# Patient Record
Sex: FEMALE | Race: OTHER | HISPANIC OR LATINO | URBAN - METROPOLITAN AREA
[De-identification: names, ages, dates, MRNs, and addresses within clinical notes are randomized per-mention and may not be internally consistent; named-entity substitution may affect disease eponyms.]

---

## 2021-12-24 ENCOUNTER — INPATIENT (INPATIENT)
Facility: HOSPITAL | Age: 76
LOS: 2 days | Discharge: HOME | End: 2021-12-27
Attending: INTERNAL MEDICINE | Admitting: INTERNAL MEDICINE
Payer: MEDICARE

## 2021-12-24 VITALS
WEIGHT: 153 LBS | SYSTOLIC BLOOD PRESSURE: 192 MMHG | OXYGEN SATURATION: 99 % | RESPIRATION RATE: 18 BRPM | DIASTOLIC BLOOD PRESSURE: 82 MMHG | HEART RATE: 84 BPM | TEMPERATURE: 98 F

## 2021-12-24 LAB
BASOPHILS # BLD AUTO: 0.02 K/UL — SIGNIFICANT CHANGE UP (ref 0–0.2)
BASOPHILS NFR BLD AUTO: 0.3 % — SIGNIFICANT CHANGE UP (ref 0–1)
EOSINOPHIL # BLD AUTO: 0.07 K/UL — SIGNIFICANT CHANGE UP (ref 0–0.7)
EOSINOPHIL NFR BLD AUTO: 0.9 % — SIGNIFICANT CHANGE UP (ref 0–8)
HCT VFR BLD CALC: 37.2 % — SIGNIFICANT CHANGE UP (ref 37–47)
HGB BLD-MCNC: 12.4 G/DL — SIGNIFICANT CHANGE UP (ref 12–16)
IMM GRANULOCYTES NFR BLD AUTO: 0.6 % — HIGH (ref 0.1–0.3)
LYMPHOCYTES # BLD AUTO: 1.07 K/UL — LOW (ref 1.2–3.4)
LYMPHOCYTES # BLD AUTO: 13.6 % — LOW (ref 20.5–51.1)
MCHC RBC-ENTMCNC: 33.3 G/DL — SIGNIFICANT CHANGE UP (ref 32–37)
MCHC RBC-ENTMCNC: 34.5 PG — HIGH (ref 27–31)
MCV RBC AUTO: 103.6 FL — HIGH (ref 81–99)
MONOCYTES # BLD AUTO: 0.63 K/UL — HIGH (ref 0.1–0.6)
MONOCYTES NFR BLD AUTO: 8 % — SIGNIFICANT CHANGE UP (ref 1.7–9.3)
NEUTROPHILS # BLD AUTO: 6.04 K/UL — SIGNIFICANT CHANGE UP (ref 1.4–6.5)
NEUTROPHILS NFR BLD AUTO: 76.6 % — HIGH (ref 42.2–75.2)
NRBC # BLD: 0 /100 WBCS — SIGNIFICANT CHANGE UP (ref 0–0)
PLATELET # BLD AUTO: 135 K/UL — SIGNIFICANT CHANGE UP (ref 130–400)
RBC # BLD: 3.59 M/UL — LOW (ref 4.2–5.4)
RBC # FLD: 11.4 % — LOW (ref 11.5–14.5)
WBC # BLD: 7.88 K/UL — SIGNIFICANT CHANGE UP (ref 4.8–10.8)
WBC # FLD AUTO: 7.88 K/UL — SIGNIFICANT CHANGE UP (ref 4.8–10.8)

## 2021-12-24 PROCEDURE — 93010 ELECTROCARDIOGRAM REPORT: CPT

## 2021-12-24 PROCEDURE — 99291 CRITICAL CARE FIRST HOUR: CPT

## 2021-12-24 RX ORDER — SODIUM CHLORIDE 9 MG/ML
1000 INJECTION, SOLUTION INTRAVENOUS ONCE
Refills: 0 | Status: COMPLETED | OUTPATIENT
Start: 2021-12-24 | End: 2021-12-24

## 2021-12-24 RX ORDER — PANTOPRAZOLE SODIUM 20 MG/1
40 TABLET, DELAYED RELEASE ORAL ONCE
Refills: 0 | Status: COMPLETED | OUTPATIENT
Start: 2021-12-24 | End: 2021-12-24

## 2021-12-24 RX ORDER — ONDANSETRON 8 MG/1
4 TABLET, FILM COATED ORAL ONCE
Refills: 0 | Status: COMPLETED | OUTPATIENT
Start: 2021-12-24 | End: 2021-12-24

## 2021-12-24 RX ORDER — PANTOPRAZOLE SODIUM 20 MG/1
8 TABLET, DELAYED RELEASE ORAL
Qty: 80 | Refills: 0 | Status: DISCONTINUED | OUTPATIENT
Start: 2021-12-24 | End: 2021-12-25

## 2021-12-24 RX ADMIN — ONDANSETRON 4 MILLIGRAM(S): 8 TABLET, FILM COATED ORAL at 23:24

## 2021-12-24 RX ADMIN — SODIUM CHLORIDE 1000 MILLILITER(S): 9 INJECTION, SOLUTION INTRAVENOUS at 23:23

## 2021-12-24 NOTE — ED PROVIDER NOTE - CLINICAL SUMMARY MEDICAL DECISION MAKING FREE TEXT BOX
pt evaluated for hematemesis, diagnosed with UGI bleed on CTA, started on protonix drip, evaluated by GI and admitted to ICU for further management

## 2021-12-24 NOTE — ED PROVIDER NOTE - NSICDXPASTMEDICALHX_GEN_ALL_CORE_FT
PAST MEDICAL HISTORY:  Breast cancer     Depression     DVT, lower extremity     GI bleed     HIV (human immunodeficiency virus infection)     Pacemaker     Pulmonary embolism

## 2021-12-24 NOTE — ED PROVIDER NOTE - ATTENDING CONTRIBUTION TO CARE
75 yo female with PMH HIV, hx breast cancer, hx PE/DVT not currently on anticoagulation, PUD presents after several episodes vomiting after eating dinner with blood noted in emesis. + watery nonbloody diarrhea reported. Pt stated she felt dizzy, without any CP, SOB, palpitations. + mild epigastric discomfort. Taking all meds as prescribed. no fevers, chills, cough, URI sx.     VITAL SIGNS: noted  CONSTITUTIONAL: Well-developed; well-nourished; in no acute distress  HEAD: Normocephalic; atraumatic  EYES: PERRL, EOM intact; conjunctiva and sclera clear  ENT: No nasal discharge; airway clear. MMM  NECK: Supple; non tender.   CARD: S1, S2 normal; no murmurs, gallops, or rubs. Regular rate and rhythm  RESP: CTAB/L, no wheezes, rales or rhonchi  ABD: Normal bowel sounds; soft; non-distended; mild epigastric discomfort no lower abdominal tenderness, no rebound or guarding, no CVA tenderness  EXT: Normal ROM. No calf tenderness or edema. Distal pulses intact  NEURO: Alert, oriented. Grossly unremarkable. No focal deficits  SKIN: Skin exam is warm and dry

## 2021-12-24 NOTE — ED ADULT NURSE NOTE - NSIMPLEMENTINTERV_GEN_ALL_ED
Implemented All Fall with Harm Risk Interventions:  [place] to call system. Call bell, personal items and telephone within reach. Instruct patient to call for assistance. Room bathroom lighting operational. Non-slip footwear when patient is off stretcher. Physically safe environment: no spills, clutter or unnecessary equipment. Stretcher in lowest position, wheels locked, appropriate side rails in place. Provide visual cue, wrist band, yellow gown, etc. Monitor gait and stability. Monitor for mental status changes and reorient to person, place, and time. Review medications for side effects contributing to fall risk. Reinforce activity limits and safety measures with patient and family. Provide visual clues: red socks.

## 2021-12-24 NOTE — ED PROVIDER NOTE - CRITICAL CARE ATTENDING CONTRIBUTION TO CARE
I have personally performed a history and physical exam on this patient and personally directed the management of the patient. Pt seen and managed with ACP above.     75 yo female with PMH HIV, hx breast cancer, hx PE/DVT not currently on anticoagulation, PUD presents after several episodes vomiting after eating dinner with blood noted in emesis. + watery nonbloody diarrhea reported. Pt stated she felt dizzy, without any CP, SOB, palpitations. + mild epigastric discomfort. Taking all meds as prescribed. no fevers, chills, cough, URI sx.     VITAL SIGNS: noted  CONSTITUTIONAL: Well-developed; well-nourished; in no acute distress  HEAD: Normocephalic; atraumatic  EYES: PERRL, EOM intact; conjunctiva and sclera clear  ENT: No nasal discharge; airway clear. MMM  NECK: Supple; non tender.   CARD: S1, S2 normal; no murmurs, gallops, or rubs. Regular rate and rhythm  RESP: CTAB/L, no wheezes, rales or rhonchi  ABD: Normal bowel sounds; soft; non-distended; mild epigastric discomfort no lower abdominal tenderness, no rebound or guarding, no CVA tenderness  EXT: Normal ROM. No calf tenderness or edema. Distal pulses intact  NEURO: Alert, oriented. Grossly unremarkable. No focal deficits  SKIN: Skin exam is warm and dry

## 2021-12-24 NOTE — ED PROVIDER NOTE - PROGRESS NOTE DETAILS
Called by radiology- CTA suggestive active bleed, Pt VSS. GI called for consulted- spoke with service and sent message via Teams.

## 2021-12-24 NOTE — ED ADULT NURSE NOTE - OBJECTIVE STATEMENT
76 year old female complaining of 2 episodes of vomiting, maroon in color and light brown diarrhea that began a few hours ago. Pt denies fever, chest pain, pelvic pain, urinary symptoms. Pt complains of nausea and abdominal pain. 76 year old female complaining of 2 episodes of vomiting, maroon in color and light brown diarrhea that began a few hours ago. Pt denies fever, chest pain, pelvic pain, urinary symptoms. Pt complains of nausea and abdominal pain. Pt denies AC.

## 2021-12-24 NOTE — ED PROVIDER NOTE - PHYSICAL EXAMINATION
Constitutional: Well developed, well nourished. NAD  Head: Normocephalic, atraumatic.  Eyes: PERRL, EOMI.  ENT: No nasal discharge. Mucous membranes dry.  Neck: Supple. Painless ROM.  Cardiovascular: Normal S1, S2. Regular rate and rhythm.   Pulmonary:   Lungs clear to auscultation bilaterally.    Abdominal: Soft. mild/minimal epigastric tenderness;  Rectal: Rn lovella present: brown stool; no melena/gross blood;  Extremities. Pelvis stable. No lower extremity edema, symmetric calves.  Skin: No rashes, cyanosis.  Neuro: AAOx3. No focal neurological deficits.  Psych: Normal mood. Normal affect.

## 2021-12-24 NOTE — ED PROVIDER NOTE - OBJECTIVE STATEMENT
76 yold female to ED Pmhx Hiv +(?cdv/viral load) on Haart tx, ppm, dvt/pe 3 yrs ago currently only on asa, hx gi bleed, Breast cancer s/p right lumpectomy, depression; pt Serbian speaking family at bed side intrep on pt request - c/o dizzness/lightheaded with n/v/diarrhea started tonight after dinner; family states pt vomitted blood; brown stool no gross blood or melena;

## 2021-12-24 NOTE — ED ADULT TRIAGE NOTE - CHIEF COMPLAINT QUOTE
PT BIBA from home, As per daughter-in-law pt was at kitchen table and began to vomit brown emesis and has an episode of diarrhea. TP c/o B/L upper abdominal pain and N/V/D.

## 2021-12-25 LAB
ABO RH CONFIRMATION: SIGNIFICANT CHANGE UP
ALBUMIN SERPL ELPH-MCNC: 3.4 G/DL — LOW (ref 3.5–5.2)
ALBUMIN SERPL ELPH-MCNC: 3.6 G/DL — SIGNIFICANT CHANGE UP (ref 3.5–5.2)
ALBUMIN SERPL ELPH-MCNC: 4.2 G/DL — SIGNIFICANT CHANGE UP (ref 3.5–5.2)
ALP SERPL-CCNC: 37 U/L — SIGNIFICANT CHANGE UP (ref 30–115)
ALP SERPL-CCNC: 37 U/L — SIGNIFICANT CHANGE UP (ref 30–115)
ALP SERPL-CCNC: 45 U/L — SIGNIFICANT CHANGE UP (ref 30–115)
ALT FLD-CCNC: 17 U/L — SIGNIFICANT CHANGE UP (ref 0–41)
ALT FLD-CCNC: 19 U/L — SIGNIFICANT CHANGE UP (ref 0–41)
ALT FLD-CCNC: 24 U/L — SIGNIFICANT CHANGE UP (ref 0–41)
ANION GAP SERPL CALC-SCNC: 12 MMOL/L — SIGNIFICANT CHANGE UP (ref 7–14)
ANION GAP SERPL CALC-SCNC: 14 MMOL/L — SIGNIFICANT CHANGE UP (ref 7–14)
ANION GAP SERPL CALC-SCNC: 16 MMOL/L — HIGH (ref 7–14)
APPEARANCE UR: ABNORMAL
APTT BLD: 28.6 SEC — SIGNIFICANT CHANGE UP (ref 27–39.2)
AST SERPL-CCNC: 17 U/L — SIGNIFICANT CHANGE UP (ref 0–41)
AST SERPL-CCNC: 21 U/L — SIGNIFICANT CHANGE UP (ref 0–41)
AST SERPL-CCNC: 23 U/L — SIGNIFICANT CHANGE UP (ref 0–41)
BACTERIA # UR AUTO: ABNORMAL
BASOPHILS # BLD AUTO: 0.01 K/UL — SIGNIFICANT CHANGE UP (ref 0–0.2)
BASOPHILS # BLD AUTO: 0.02 K/UL — SIGNIFICANT CHANGE UP (ref 0–0.2)
BASOPHILS # BLD AUTO: 0.03 K/UL — SIGNIFICANT CHANGE UP (ref 0–0.2)
BASOPHILS NFR BLD AUTO: 0.1 % — SIGNIFICANT CHANGE UP (ref 0–1)
BASOPHILS NFR BLD AUTO: 0.3 % — SIGNIFICANT CHANGE UP (ref 0–1)
BASOPHILS NFR BLD AUTO: 0.3 % — SIGNIFICANT CHANGE UP (ref 0–1)
BILIRUB SERPL-MCNC: 0.5 MG/DL — SIGNIFICANT CHANGE UP (ref 0.2–1.2)
BILIRUB SERPL-MCNC: 0.5 MG/DL — SIGNIFICANT CHANGE UP (ref 0.2–1.2)
BILIRUB SERPL-MCNC: 1 MG/DL — SIGNIFICANT CHANGE UP (ref 0.2–1.2)
BILIRUB UR-MCNC: NEGATIVE — SIGNIFICANT CHANGE UP
BLD GP AB SCN SERPL QL: SIGNIFICANT CHANGE UP
BUN SERPL-MCNC: 25 MG/DL — HIGH (ref 10–20)
BUN SERPL-MCNC: 29 MG/DL — HIGH (ref 10–20)
BUN SERPL-MCNC: 33 MG/DL — HIGH (ref 10–20)
CALCIUM SERPL-MCNC: 10.4 MG/DL — HIGH (ref 8.5–10.1)
CALCIUM SERPL-MCNC: 8.9 MG/DL — SIGNIFICANT CHANGE UP (ref 8.5–10.1)
CALCIUM SERPL-MCNC: 9.2 MG/DL — SIGNIFICANT CHANGE UP (ref 8.5–10.1)
CHLORIDE SERPL-SCNC: 104 MMOL/L — SIGNIFICANT CHANGE UP (ref 98–110)
CHLORIDE SERPL-SCNC: 107 MMOL/L — SIGNIFICANT CHANGE UP (ref 98–110)
CHLORIDE SERPL-SCNC: 113 MMOL/L — HIGH (ref 98–110)
CO2 SERPL-SCNC: 16 MMOL/L — LOW (ref 17–32)
CO2 SERPL-SCNC: 20 MMOL/L — SIGNIFICANT CHANGE UP (ref 17–32)
CO2 SERPL-SCNC: 24 MMOL/L — SIGNIFICANT CHANGE UP (ref 17–32)
COLOR SPEC: YELLOW — SIGNIFICANT CHANGE UP
CREAT SERPL-MCNC: 0.7 MG/DL — SIGNIFICANT CHANGE UP (ref 0.7–1.5)
CREAT SERPL-MCNC: 0.7 MG/DL — SIGNIFICANT CHANGE UP (ref 0.7–1.5)
CREAT SERPL-MCNC: 0.9 MG/DL — SIGNIFICANT CHANGE UP (ref 0.7–1.5)
DIFF PNL FLD: NEGATIVE — SIGNIFICANT CHANGE UP
EOSINOPHIL # BLD AUTO: 0.01 K/UL — SIGNIFICANT CHANGE UP (ref 0–0.7)
EOSINOPHIL # BLD AUTO: 0.01 K/UL — SIGNIFICANT CHANGE UP (ref 0–0.7)
EOSINOPHIL # BLD AUTO: 0.06 K/UL — SIGNIFICANT CHANGE UP (ref 0–0.7)
EOSINOPHIL NFR BLD AUTO: 0.1 % — SIGNIFICANT CHANGE UP (ref 0–8)
EOSINOPHIL NFR BLD AUTO: 0.1 % — SIGNIFICANT CHANGE UP (ref 0–8)
EOSINOPHIL NFR BLD AUTO: 0.8 % — SIGNIFICANT CHANGE UP (ref 0–8)
EPI CELLS # UR: 2 /HPF — SIGNIFICANT CHANGE UP (ref 0–5)
GLUCOSE SERPL-MCNC: 120 MG/DL — HIGH (ref 70–99)
GLUCOSE SERPL-MCNC: 125 MG/DL — HIGH (ref 70–99)
GLUCOSE SERPL-MCNC: 97 MG/DL — SIGNIFICANT CHANGE UP (ref 70–99)
GLUCOSE UR QL: NEGATIVE — SIGNIFICANT CHANGE UP
HCT VFR BLD CALC: 29.9 % — LOW (ref 37–47)
HCT VFR BLD CALC: 31.2 % — LOW (ref 37–47)
HCT VFR BLD CALC: 32.4 % — LOW (ref 37–47)
HCT VFR BLD CALC: 33.8 % — LOW (ref 37–47)
HGB BLD-MCNC: 10.1 G/DL — LOW (ref 12–16)
HGB BLD-MCNC: 10.3 G/DL — LOW (ref 12–16)
HGB BLD-MCNC: 10.9 G/DL — LOW (ref 12–16)
HGB BLD-MCNC: 11.2 G/DL — LOW (ref 12–16)
HYALINE CASTS # UR AUTO: 1 /LPF — SIGNIFICANT CHANGE UP (ref 0–7)
IMM GRANULOCYTES NFR BLD AUTO: 0.2 % — SIGNIFICANT CHANGE UP (ref 0.1–0.3)
IMM GRANULOCYTES NFR BLD AUTO: 0.4 % — HIGH (ref 0.1–0.3)
IMM GRANULOCYTES NFR BLD AUTO: 0.6 % — HIGH (ref 0.1–0.3)
INR BLD: 1.09 RATIO — SIGNIFICANT CHANGE UP (ref 0.65–1.3)
KETONES UR-MCNC: ABNORMAL
LACTATE SERPL-SCNC: 1.2 MMOL/L — SIGNIFICANT CHANGE UP (ref 0.7–2)
LEUKOCYTE ESTERASE UR-ACNC: ABNORMAL
LIDOCAIN IGE QN: 33 U/L — SIGNIFICANT CHANGE UP (ref 7–60)
LYMPHOCYTES # BLD AUTO: 1.04 K/UL — LOW (ref 1.2–3.4)
LYMPHOCYTES # BLD AUTO: 1.73 K/UL — SIGNIFICANT CHANGE UP (ref 1.2–3.4)
LYMPHOCYTES # BLD AUTO: 1.81 K/UL — SIGNIFICANT CHANGE UP (ref 1.2–3.4)
LYMPHOCYTES # BLD AUTO: 10.5 % — LOW (ref 20.5–51.1)
LYMPHOCYTES # BLD AUTO: 18.5 % — LOW (ref 20.5–51.1)
LYMPHOCYTES # BLD AUTO: 23.8 % — SIGNIFICANT CHANGE UP (ref 20.5–51.1)
MAGNESIUM SERPL-MCNC: 1.8 MG/DL — SIGNIFICANT CHANGE UP (ref 1.8–2.4)
MCHC RBC-ENTMCNC: 33 G/DL — SIGNIFICANT CHANGE UP (ref 32–37)
MCHC RBC-ENTMCNC: 33.1 G/DL — SIGNIFICANT CHANGE UP (ref 32–37)
MCHC RBC-ENTMCNC: 33.6 G/DL — SIGNIFICANT CHANGE UP (ref 32–37)
MCHC RBC-ENTMCNC: 33.8 G/DL — SIGNIFICANT CHANGE UP (ref 32–37)
MCHC RBC-ENTMCNC: 33.8 PG — HIGH (ref 27–31)
MCHC RBC-ENTMCNC: 34 PG — HIGH (ref 27–31)
MCHC RBC-ENTMCNC: 34.4 PG — HIGH (ref 27–31)
MCHC RBC-ENTMCNC: 34.7 PG — HIGH (ref 27–31)
MCV RBC AUTO: 100.7 FL — HIGH (ref 81–99)
MCV RBC AUTO: 102.1 FL — HIGH (ref 81–99)
MCV RBC AUTO: 102.2 FL — HIGH (ref 81–99)
MCV RBC AUTO: 105.1 FL — HIGH (ref 81–99)
MONOCYTES # BLD AUTO: 0.83 K/UL — HIGH (ref 0.1–0.6)
MONOCYTES # BLD AUTO: 1.09 K/UL — HIGH (ref 0.1–0.6)
MONOCYTES # BLD AUTO: 1.22 K/UL — HIGH (ref 0.1–0.6)
MONOCYTES NFR BLD AUTO: 11 % — HIGH (ref 1.7–9.3)
MONOCYTES NFR BLD AUTO: 11.4 % — HIGH (ref 1.7–9.3)
MONOCYTES NFR BLD AUTO: 12.5 % — HIGH (ref 1.7–9.3)
NEUTROPHILS # BLD AUTO: 4.61 K/UL — SIGNIFICANT CHANGE UP (ref 1.4–6.5)
NEUTROPHILS # BLD AUTO: 6.69 K/UL — HIGH (ref 1.4–6.5)
NEUTROPHILS # BLD AUTO: 7.7 K/UL — HIGH (ref 1.4–6.5)
NEUTROPHILS NFR BLD AUTO: 63.3 % — SIGNIFICANT CHANGE UP (ref 42.2–75.2)
NEUTROPHILS NFR BLD AUTO: 68.6 % — SIGNIFICANT CHANGE UP (ref 42.2–75.2)
NEUTROPHILS NFR BLD AUTO: 77.5 % — HIGH (ref 42.2–75.2)
NITRITE UR-MCNC: POSITIVE
NRBC # BLD: 0 /100 WBCS — SIGNIFICANT CHANGE UP (ref 0–0)
PH UR: 8 — SIGNIFICANT CHANGE UP (ref 5–8)
PLATELET # BLD AUTO: 110 K/UL — LOW (ref 130–400)
PLATELET # BLD AUTO: 113 K/UL — LOW (ref 130–400)
PLATELET # BLD AUTO: 121 K/UL — LOW (ref 130–400)
PLATELET # BLD AUTO: 124 K/UL — LOW (ref 130–400)
POTASSIUM SERPL-MCNC: 4.2 MMOL/L — SIGNIFICANT CHANGE UP (ref 3.5–5)
POTASSIUM SERPL-MCNC: 4.3 MMOL/L — SIGNIFICANT CHANGE UP (ref 3.5–5)
POTASSIUM SERPL-MCNC: 4.5 MMOL/L — SIGNIFICANT CHANGE UP (ref 3.5–5)
POTASSIUM SERPL-SCNC: 4.2 MMOL/L — SIGNIFICANT CHANGE UP (ref 3.5–5)
POTASSIUM SERPL-SCNC: 4.3 MMOL/L — SIGNIFICANT CHANGE UP (ref 3.5–5)
POTASSIUM SERPL-SCNC: 4.5 MMOL/L — SIGNIFICANT CHANGE UP (ref 3.5–5)
PROT SERPL-MCNC: 5.3 G/DL — LOW (ref 6–8)
PROT SERPL-MCNC: 5.6 G/DL — LOW (ref 6–8)
PROT SERPL-MCNC: 6.6 G/DL — SIGNIFICANT CHANGE UP (ref 6–8)
PROT UR-MCNC: ABNORMAL
PROTHROM AB SERPL-ACNC: 12.5 SEC — SIGNIFICANT CHANGE UP (ref 9.95–12.87)
RBC # BLD: 2.97 M/UL — LOW (ref 4.2–5.4)
RBC # BLD: 2.97 M/UL — LOW (ref 4.2–5.4)
RBC # BLD: 3.17 M/UL — LOW (ref 4.2–5.4)
RBC # BLD: 3.31 M/UL — LOW (ref 4.2–5.4)
RBC # FLD: 11.5 % — SIGNIFICANT CHANGE UP (ref 11.5–14.5)
RBC # FLD: 13.9 % — SIGNIFICANT CHANGE UP (ref 11.5–14.5)
RBC # FLD: 14.5 % — SIGNIFICANT CHANGE UP (ref 11.5–14.5)
RBC # FLD: 14.6 % — HIGH (ref 11.5–14.5)
RBC CASTS # UR COMP ASSIST: 11 /HPF — HIGH (ref 0–4)
SARS-COV-2 RNA SPEC QL NAA+PROBE: SIGNIFICANT CHANGE UP
SODIUM SERPL-SCNC: 139 MMOL/L — SIGNIFICANT CHANGE UP (ref 135–146)
SODIUM SERPL-SCNC: 142 MMOL/L — SIGNIFICANT CHANGE UP (ref 135–146)
SODIUM SERPL-SCNC: 145 MMOL/L — SIGNIFICANT CHANGE UP (ref 135–146)
SP GR SPEC: 1.01 — SIGNIFICANT CHANGE UP (ref 1.01–1.03)
UROBILINOGEN FLD QL: SIGNIFICANT CHANGE UP
WBC # BLD: 10.07 K/UL — SIGNIFICANT CHANGE UP (ref 4.8–10.8)
WBC # BLD: 7.28 K/UL — SIGNIFICANT CHANGE UP (ref 4.8–10.8)
WBC # BLD: 9.76 K/UL — SIGNIFICANT CHANGE UP (ref 4.8–10.8)
WBC # BLD: 9.93 K/UL — SIGNIFICANT CHANGE UP (ref 4.8–10.8)
WBC # FLD AUTO: 10.07 K/UL — SIGNIFICANT CHANGE UP (ref 4.8–10.8)
WBC # FLD AUTO: 7.28 K/UL — SIGNIFICANT CHANGE UP (ref 4.8–10.8)
WBC # FLD AUTO: 9.76 K/UL — SIGNIFICANT CHANGE UP (ref 4.8–10.8)
WBC # FLD AUTO: 9.93 K/UL — SIGNIFICANT CHANGE UP (ref 4.8–10.8)
WBC UR QL: 57 /HPF — HIGH (ref 0–5)

## 2021-12-25 PROCEDURE — 43255 EGD CONTROL BLEEDING ANY: CPT | Mod: 59

## 2021-12-25 PROCEDURE — 74174 CTA ABD&PLVS W/CONTRAST: CPT | Mod: 26,MA

## 2021-12-25 PROCEDURE — 99223 1ST HOSP IP/OBS HIGH 75: CPT | Mod: 25

## 2021-12-25 PROCEDURE — 71045 X-RAY EXAM CHEST 1 VIEW: CPT | Mod: 26

## 2021-12-25 PROCEDURE — 99222 1ST HOSP IP/OBS MODERATE 55: CPT

## 2021-12-25 RX ORDER — CEFTRIAXONE 500 MG/1
1000 INJECTION, POWDER, FOR SOLUTION INTRAMUSCULAR; INTRAVENOUS EVERY 24 HOURS
Refills: 0 | Status: DISCONTINUED | OUTPATIENT
Start: 2021-12-25 | End: 2021-12-25

## 2021-12-25 RX ORDER — LOSARTAN POTASSIUM 100 MG/1
100 TABLET, FILM COATED ORAL DAILY
Refills: 0 | Status: DISCONTINUED | OUTPATIENT
Start: 2021-12-25 | End: 2021-12-25

## 2021-12-25 RX ORDER — CEFTRIAXONE 500 MG/1
1000 INJECTION, POWDER, FOR SOLUTION INTRAMUSCULAR; INTRAVENOUS EVERY 24 HOURS
Refills: 0 | Status: DISCONTINUED | OUTPATIENT
Start: 2021-12-25 | End: 2021-12-27

## 2021-12-25 RX ORDER — ASPIRIN/CALCIUM CARB/MAGNESIUM 324 MG
1 TABLET ORAL
Qty: 0 | Refills: 0 | DISCHARGE

## 2021-12-25 RX ORDER — CHLORHEXIDINE GLUCONATE 213 G/1000ML
1 SOLUTION TOPICAL
Refills: 0 | Status: DISCONTINUED | OUTPATIENT
Start: 2021-12-25 | End: 2021-12-25

## 2021-12-25 RX ORDER — BENZOCAINE 10 %
1 GEL (GRAM) MUCOUS MEMBRANE THREE TIMES A DAY
Refills: 0 | Status: DISCONTINUED | OUTPATIENT
Start: 2021-12-25 | End: 2021-12-27

## 2021-12-25 RX ORDER — MONTELUKAST 4 MG/1
1 TABLET, CHEWABLE ORAL
Qty: 0 | Refills: 0 | DISCHARGE

## 2021-12-25 RX ORDER — PANTOPRAZOLE SODIUM 20 MG/1
8 TABLET, DELAYED RELEASE ORAL
Qty: 80 | Refills: 0 | Status: DISCONTINUED | OUTPATIENT
Start: 2021-12-25 | End: 2021-12-25

## 2021-12-25 RX ORDER — BRIMONIDINE TARTRATE 2 MG/MG
1 SOLUTION/ DROPS OPHTHALMIC THREE TIMES A DAY
Refills: 0 | Status: DISCONTINUED | OUTPATIENT
Start: 2021-12-25 | End: 2021-12-25

## 2021-12-25 RX ORDER — MONTELUKAST 4 MG/1
10 TABLET, CHEWABLE ORAL DAILY
Refills: 0 | Status: DISCONTINUED | OUTPATIENT
Start: 2021-12-25 | End: 2021-12-27

## 2021-12-25 RX ORDER — SODIUM CHLORIDE 9 MG/ML
1000 INJECTION, SOLUTION INTRAVENOUS
Refills: 0 | Status: DISCONTINUED | OUTPATIENT
Start: 2021-12-25 | End: 2021-12-25

## 2021-12-25 RX ORDER — ATORVASTATIN CALCIUM 80 MG/1
80 TABLET, FILM COATED ORAL AT BEDTIME
Refills: 0 | Status: DISCONTINUED | OUTPATIENT
Start: 2021-12-25 | End: 2021-12-25

## 2021-12-25 RX ORDER — BRIMONIDINE TARTRATE 2 MG/MG
1 SOLUTION/ DROPS OPHTHALMIC THREE TIMES A DAY
Refills: 0 | Status: DISCONTINUED | OUTPATIENT
Start: 2021-12-25 | End: 2021-12-27

## 2021-12-25 RX ORDER — ONDANSETRON 8 MG/1
4 TABLET, FILM COATED ORAL
Refills: 0 | Status: DISCONTINUED | OUTPATIENT
Start: 2021-12-25 | End: 2021-12-27

## 2021-12-25 RX ORDER — ERYTHROMYCIN ETHYLSUCCINATE 400 MG
250 TABLET ORAL ONCE
Refills: 0 | Status: COMPLETED | OUTPATIENT
Start: 2021-12-25 | End: 2021-12-25

## 2021-12-25 RX ORDER — CEFTRIAXONE 500 MG/1
1000 INJECTION, POWDER, FOR SOLUTION INTRAMUSCULAR; INTRAVENOUS ONCE
Refills: 0 | Status: COMPLETED | OUTPATIENT
Start: 2021-12-25 | End: 2021-12-25

## 2021-12-25 RX ORDER — LIDOCAINE 4 G/100G
1 CREAM TOPICAL ONCE
Refills: 0 | Status: COMPLETED | OUTPATIENT
Start: 2021-12-25 | End: 2021-12-25

## 2021-12-25 RX ORDER — LATANOPROST 0.05 MG/ML
1 SOLUTION/ DROPS OPHTHALMIC; TOPICAL
Qty: 0 | Refills: 0 | DISCHARGE

## 2021-12-25 RX ORDER — PANTOPRAZOLE SODIUM 20 MG/1
8 TABLET, DELAYED RELEASE ORAL
Qty: 80 | Refills: 0 | Status: DISCONTINUED | OUTPATIENT
Start: 2021-12-25 | End: 2021-12-26

## 2021-12-25 RX ORDER — CHLORHEXIDINE GLUCONATE 213 G/1000ML
1 SOLUTION TOPICAL
Refills: 0 | Status: DISCONTINUED | OUTPATIENT
Start: 2021-12-25 | End: 2021-12-27

## 2021-12-25 RX ORDER — SODIUM CHLORIDE 9 MG/ML
1000 INJECTION, SOLUTION INTRAVENOUS
Refills: 0 | Status: DISCONTINUED | OUTPATIENT
Start: 2021-12-25 | End: 2021-12-26

## 2021-12-25 RX ORDER — BICTEGRAVIR SODIUM, EMTRICITABINE, AND TENOFOVIR ALAFENAMIDE FUMARATE 30; 120; 15 MG/1; MG/1; MG/1
1 TABLET ORAL DAILY
Refills: 0 | Status: DISCONTINUED | OUTPATIENT
Start: 2021-12-25 | End: 2021-12-25

## 2021-12-25 RX ORDER — CITALOPRAM 10 MG/1
1 TABLET, FILM COATED ORAL
Qty: 0 | Refills: 0 | DISCHARGE

## 2021-12-25 RX ORDER — CITALOPRAM 10 MG/1
20 TABLET, FILM COATED ORAL DAILY
Refills: 0 | Status: DISCONTINUED | OUTPATIENT
Start: 2021-12-25 | End: 2021-12-25

## 2021-12-25 RX ORDER — CITALOPRAM 10 MG/1
20 TABLET, FILM COATED ORAL DAILY
Refills: 0 | Status: DISCONTINUED | OUTPATIENT
Start: 2021-12-25 | End: 2021-12-27

## 2021-12-25 RX ORDER — BRIMONIDINE TARTRATE 2 MG/MG
1 SOLUTION/ DROPS OPHTHALMIC
Qty: 0 | Refills: 0 | DISCHARGE

## 2021-12-25 RX ORDER — ROSUVASTATIN CALCIUM 5 MG/1
1 TABLET ORAL
Qty: 0 | Refills: 0 | DISCHARGE

## 2021-12-25 RX ORDER — BICTEGRAVIR SODIUM, EMTRICITABINE, AND TENOFOVIR ALAFENAMIDE FUMARATE 30; 120; 15 MG/1; MG/1; MG/1
1 TABLET ORAL DAILY
Refills: 0 | Status: DISCONTINUED | OUTPATIENT
Start: 2021-12-25 | End: 2021-12-27

## 2021-12-25 RX ORDER — MONTELUKAST 4 MG/1
10 TABLET, CHEWABLE ORAL DAILY
Refills: 0 | Status: DISCONTINUED | OUTPATIENT
Start: 2021-12-25 | End: 2021-12-25

## 2021-12-25 RX ORDER — BICTEGRAVIR SODIUM, EMTRICITABINE, AND TENOFOVIR ALAFENAMIDE FUMARATE 30; 120; 15 MG/1; MG/1; MG/1
1 TABLET ORAL
Qty: 0 | Refills: 0 | DISCHARGE

## 2021-12-25 RX ADMIN — CEFTRIAXONE 100 MILLIGRAM(S): 500 INJECTION, POWDER, FOR SOLUTION INTRAMUSCULAR; INTRAVENOUS at 02:00

## 2021-12-25 RX ADMIN — PANTOPRAZOLE SODIUM 10 MG/HR: 20 TABLET, DELAYED RELEASE ORAL at 00:06

## 2021-12-25 RX ADMIN — BRIMONIDINE TARTRATE 1 DROP(S): 2 SOLUTION/ DROPS OPHTHALMIC at 07:11

## 2021-12-25 RX ADMIN — CHLORHEXIDINE GLUCONATE 1 APPLICATION(S): 213 SOLUTION TOPICAL at 07:12

## 2021-12-25 RX ADMIN — BRIMONIDINE TARTRATE 1 DROP(S): 2 SOLUTION/ DROPS OPHTHALMIC at 22:34

## 2021-12-25 RX ADMIN — CITALOPRAM 20 MILLIGRAM(S): 10 TABLET, FILM COATED ORAL at 13:31

## 2021-12-25 RX ADMIN — PANTOPRAZOLE SODIUM 40 MILLIGRAM(S): 20 TABLET, DELAYED RELEASE ORAL at 00:05

## 2021-12-25 RX ADMIN — PANTOPRAZOLE SODIUM 10 MG/HR: 20 TABLET, DELAYED RELEASE ORAL at 07:39

## 2021-12-25 RX ADMIN — Medication 1 SPRAY(S): at 23:02

## 2021-12-25 RX ADMIN — BRIMONIDINE TARTRATE 1 DROP(S): 2 SOLUTION/ DROPS OPHTHALMIC at 13:31

## 2021-12-25 RX ADMIN — PANTOPRAZOLE SODIUM 10 MG/HR: 20 TABLET, DELAYED RELEASE ORAL at 15:52

## 2021-12-25 RX ADMIN — CHLORHEXIDINE GLUCONATE 1 APPLICATION(S): 213 SOLUTION TOPICAL at 13:32

## 2021-12-25 RX ADMIN — SODIUM CHLORIDE 75 MILLILITER(S): 9 INJECTION, SOLUTION INTRAVENOUS at 22:33

## 2021-12-25 RX ADMIN — CEFTRIAXONE 100 MILLIGRAM(S): 500 INJECTION, POWDER, FOR SOLUTION INTRAMUSCULAR; INTRAVENOUS at 22:34

## 2021-12-25 RX ADMIN — BICTEGRAVIR SODIUM, EMTRICITABINE, AND TENOFOVIR ALAFENAMIDE FUMARATE 1 TABLET(S): 30; 120; 15 TABLET ORAL at 13:32

## 2021-12-25 RX ADMIN — Medication 250 MILLIGRAM(S): at 03:28

## 2021-12-25 RX ADMIN — MONTELUKAST 10 MILLIGRAM(S): 4 TABLET, CHEWABLE ORAL at 13:31

## 2021-12-25 NOTE — CONSULT NOTE ADULT - ASSESSMENT
ASSESSMENT:     Patient is 76 years old Chinese speaking female with PMH of HIV( on HAART therapy, unknown CD4 count and viral load), breast cancer  s/p right lumpectomy, PE/DVT (3 years ago, on ASA, not currently on anticoagulation), PUD( 15 years ago), depression presents after several episodes vomiting after eating dinner with blood noted in emesis. Pt had dinner last night followed by multiple episodes of NBNB vomiting followed by 3-4 episodes of hematemesis more than cupful associated with epigastric discomfort. Pr reports taking Alleve daily.  Patient reported feeling dizzy, admits to watery nonbloody diarrhea. Had an EGD and colonoscopy 7 years ago at [place].   Denies fevers, chills, cough, CP, SOB, palpitations. Taking all meds as prescribed, however does not remember the names of her medications.   Pt was upgraded to ICU. S/P EGD with Gastroenterology.  PT denies any N/V, abdominal pain associated with food.       PLAN:   - Continue care per primary team  - No acute surgical intervention  - Vascular surgery will follow up    - Patient seen/examined or Plan Discussed with Fellow, Dr. Melendrez  - Plan to be discussed with Attending, Dr. De Anda    Vascular surgery spectra: [id number]   ASSESSMENT:     Patient is 76 years old Liberian speaking female with PMH of HIV( on HAART therapy, unknown CD4 count and viral load), breast cancer  s/p right lumpectomy, PE/DVT (3 years ago, on ASA, not currently on anticoagulation), PUD( 15 years ago), depression presents after several episodes vomiting after eating dinner with blood noted in emesis. Pt had dinner last night followed by multiple episodes of NBNB vomiting followed by 3-4 episodes of hematemesis more than cupful associated with epigastric discomfort. Pr reports taking Alleve daily.  Patient reported feeling dizzy, admits to watery nonbloody diarrhea. Had an EGD and colonoscopy 7 years ago at [place].   Denies fevers, chills, cough, CP, SOB, palpitations. Taking all meds as prescribed, however does not remember the names of her medications.   Pt was upgraded to ICU. S/P EGD with Gastroenterology.  PT denies any N/V, abdominal pain associated with food.       PLAN:   - Continue care per primary team  - No acute surgical intervention  - Vascular surgery will follow up    - Patient seen/examined, plan Discussed with Fellow, Dr. Melendrez  - Plan to be discussed with Attending, Dr. De Anda    Vascular surgery spectra: [id number]

## 2021-12-25 NOTE — CONSULT NOTE ADULT - SUBJECTIVE AND OBJECTIVE BOX
Gastroenterology Consultation:    Patient is a 76y old  Female who presents with a chief complaint of     Admitted on: 12-25-21  HPI:      Prior EGD:  Prior Colonoscopy:      PAST MEDICAL & SURGICAL HISTORY:  HIV (human immunodeficiency virus infection)    Pacemaker    Breast cancer    DVT, lower extremity    Pulmonary embolism    GI bleed    Depression        FAMILY HISTORY:      Social History:  Tobacco:  Alcohol:  Drugs:    Home Medications:    MEDICATIONS  (STANDING):  cefTRIAXone   IVPB 1000 milliGRAM(s) IV Intermittent every 24 hours  chlorhexidine 4% Liquid 1 Application(s) Topical <User Schedule>  erythromycin   IVPB 250 milliGRAM(s) IV Intermittent once  pantoprazole Infusion 8 mG/Hr (10 mL/Hr) IV Continuous <Continuous>  pantoprazole Infusion 8 mG/Hr (10 mL/Hr) IV Continuous <Continuous>    MEDICATIONS  (PRN):      Allergies  No Known Allergies      Review of Systems:   Constitutional:  No Fever, No Chills  ENT/Mouth:  No Hearing Changes,  No Difficulty Swallowing  Eyes:  No Eye Pain, No Vision Changes  Cardiovascular:  No Chest Pain, No Palpitations  Respiratory:  No Cough, No Dyspnea  Gastrointestinal:  As described in HPI  Musculoskeletal:  No Joint Swelling, No Back Pain  Skin:  No Skin Lesions, No Jaundice  Neuro:  No Syncope, No Dizziness  Heme/Lymph:  No Bruising, No Bleeding.          Physical Examination:  T(C): 36.4 (12-25-21 @ 00:49), Max: 36.8 (12-24-21 @ 22:05)  HR: 93 (12-25-21 @ 00:49) (84 - 126)  BP: 133/62 (12-25-21 @ 00:49) (133/62 - 192/82)  RR: 18 (12-25-21 @ 00:49) (18 - 18)  SpO2: 95% (12-25-21 @ 00:49) (95% - 99%)    Weight (kg): 69.4 (12-24-21 @ 22:05)      Constitutional: No acute distress.  Eyes:. Conjunctivae are clear, Sclera is non-icteric.  Ears Nose and Throat: The external ears are normal appearing,  Oral mucosa is pink and moist.  Respiratory:  No signs of respiratory distress. Lung sounds are clear bilaterally.  Cardiovascular:  S1 S2, Regular rate and rhythm.  GI: Abdomen is soft, symmetric, and non-tender without distention. There are no visible lesions or scars. Bowel sounds are present and normoactive in all four quadrants. No masses, hepatomegaly, or splenomegaly are noted.   Neuro: No Tremor, No involuntary movements  Skin: No rashes, No Jaundice.          Data:                        12.4   7.88  )-----------( 135      ( 24 Dec 2021 23:25 )             37.2     Hgb Trend:  12.4  12-24-21 @ 23:25      12-24    142  |  104  |  25<H>  ----------------------------<  120<H>  4.3   |  24  |  0.9    Ca    10.4<H>      24 Dec 2021 23:25    TPro  6.6  /  Alb  4.2  /  TBili  0.5  /  DBili  x   /  AST  23  /  ALT  24  /  AlkPhos  45  12-24    Liver panel trend:  TBili 0.5   /   AST 23   /   ALT 24   /   AlkP 45   /   Tptn 6.6   /   Alb 4.2    /   DBili --      12-24              Radiology:  CT Angio Abdomen and Pelvis w/ IV Cont:   ACC: [id number] EXAM:  CT ANGIO ABD PELV (W)AW IC                          PROCEDURE DATE:  12/25/2021          INTERPRETATION:  Clinical History / Reason for exam: GI bleed.    TECHNIQUE: Multislice helical sections were obtained from the domes of   the diaphragm to the pubic symphysis prior to and following intravenous   administration of 100 cc of Omnipaque 350, utilizing CT angiogram, GI   bleed protocol. Coronal and sagittal reformatted images and 3-D/MIP   reconstruction is performed.    COMPARISON: None.    FINDINGS:    LOWER CHEST: Bibasilar subsegmental atelectasis. ICD lead partially   visualized.    HEPATOBILIARY:  Unremarkable liver. Cholecystectomy.    SPLEEN: Unremarkable.    PANCREAS: Unremarkable.    ADRENAL GLANDS: Unremarkable.    KIDNEYS: Symmetric pattern of renal enhancement. No hydronephrosis.   Duplicated right renal collecting system. Postsurgical changes versus   scarring of the left lower pole. Subcentimeter renal hypodensities too   small to characterize    ABDOMINOPELVIC NODES: No abdominopelvic lymphadenopathy.    PELVIC ORGANS: Thickening and mucosal hyperenhancement of the urinary   bladder wall.    PERITONEUM /MESENTERY/BOWEL: Focus of hyperdensity in the lesser   curvature of the stomach just below thegastroesophageal junction on   arterial phase imaging which blooms on venous phase (series 7 image 97,   series 8 image 35, 46). Pooling hyperdensity noted within the gastric   lumen. Note is made of asymmetric thickening along the lesser curvature   with suggestion of focal outpouching in the region of active arterial   extravasation. No bowel obstruction, pneumoperitoneum, or ascites.    BONES/SOFT TISSUES:Degenerative changes of the visualized thoracal lumbar   spine.    OTHER: Calcified atherosclerotic disease of the aorta and branch vessels.   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    IMPRESSION:    Active arterial extravasation within the lesser curvature of the stomach,   just below the gastroesophageal junction.    *Asymmetric wall thickening along the lesser curvature with suggestion of   focal outpouching in the region of active arterial extravasation.   Findings may reflect active GI bleed secondary to ulcer disease.    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    Mucosal hyperenhancement and thickening of the urinary bladder wall;   correlate with urinalysis to exclude cystitis.    Dr. Pantera Clemente discussed preliminary findings with ABEL CARRILLO DO on 12/25/2021 1:57 AM with readback.    *Added to final report.    --- End of Report ---          PANTERA CLEMENTE MD; Resident Radiologist  This document has been electronically signed.  ANGEL VELASQUEZ MD; Attending Radiologist  This document has been electronically signed. Dec 25 2021  2:42AM (12-25-21 @ 01:50)       Gastroenterology Consultation:    Patient is a 76y old  Female who presents with a chief complaint of hematemesis    Admitted on: 12-25-21  HPI:76 yold female to ED Pmhx Hiv +(?cdv/viral load) on Haart tx, ppm, dvt/pe 3 yrs ago currently only on asa, hx gi bleed, Breast cancer s/p right lumpectomy, depression; pt Portuguese speaking family at bed side intrep on pt request - c/o dizzness/lightheaded with n/v/diarrhea started tonight after dinner; family states pt vomitted blood; brown stool no gross blood or melena  Had dinner last night followed by multiple episodes of NBNB vomiting followed by 3-4 episodes of hematemesis more than cupful associated with epigastric discomfort. h/o alleve intake every day. Had an EGD and colonospy 7 years ago at [place]. h/o PUD 15 years ago.       Prior EGD:  Prior Colonoscopy:      PAST MEDICAL & SURGICAL HISTORY:  HIV (human immunodeficiency virus infection)    Pacemaker    Breast cancer    DVT, lower extremity    Pulmonary embolism    GI bleed    Depression        FAMILY HISTORY:  no GI cancers    Social History:  Tobacco: N  Alcohol: N  Drugs: N    Home Medications:    MEDICATIONS  (STANDING):  cefTRIAXone   IVPB 1000 milliGRAM(s) IV Intermittent every 24 hours  chlorhexidine 4% Liquid 1 Application(s) Topical <User Schedule>  erythromycin   IVPB 250 milliGRAM(s) IV Intermittent once  pantoprazole Infusion 8 mG/Hr (10 mL/Hr) IV Continuous <Continuous>  pantoprazole Infusion 8 mG/Hr (10 mL/Hr) IV Continuous <Continuous>    MEDICATIONS  (PRN):      Allergies  No Known Allergies      Review of Systems:   Constitutional:  No Fever, No Chills  ENT/Mouth:  No Hearing Changes,  No Difficulty Swallowing  Eyes:  No Eye Pain, No Vision Changes  Cardiovascular:  No Chest Pain, No Palpitations  Respiratory:  No Cough, No Dyspnea  Gastrointestinal:  As described in HPI  Musculoskeletal:  No Joint Swelling, No Back Pain  Skin:  No Skin Lesions, No Jaundice  Neuro:  No Syncope, No Dizziness  Heme/Lymph:  No Bruising, No Bleeding.          Physical Examination:  T(C): 36.4 (12-25-21 @ 00:49), Max: 36.8 (12-24-21 @ 22:05)  HR: 93 (12-25-21 @ 00:49) (84 - 126)  BP: 133/62 (12-25-21 @ 00:49) (133/62 - 192/82)  RR: 18 (12-25-21 @ 00:49) (18 - 18)  SpO2: 95% (12-25-21 @ 00:49) (95% - 99%)    Weight (kg): 69.4 (12-24-21 @ 22:05)      Constitutional: No acute distress.  Eyes:. Conjunctivae are clear, Sclera is non-icteric.  Ears Nose and Throat: The external ears are normal appearing,  Oral mucosa is pink and moist.  Respiratory:  No signs of respiratory distress. Lung sounds are clear bilaterally.  Cardiovascular:  S1 S2, Regular rate and rhythm.  GI: Abdomen is soft, symmetric, and non-tender without distention.   Neuro: No Tremor, No involuntary movements  Skin: No rashes, No Jaundice.          Data:                        12.4   7.88  )-----------( 135      ( 24 Dec 2021 23:25 )             37.2     Hgb Trend:  12.4  12-24-21 @ 23:25      12-24    142  |  104  |  25<H>  ----------------------------<  120<H>  4.3   |  24  |  0.9    Ca    10.4<H>      24 Dec 2021 23:25    TPro  6.6  /  Alb  4.2  /  TBili  0.5  /  DBili  x   /  AST  23  /  ALT  24  /  AlkPhos  45  12-24    Liver panel trend:  TBili 0.5   /   AST 23   /   ALT 24   /   AlkP 45   /   Tptn 6.6   /   Alb 4.2    /   DBili --      12-24              Radiology:  CT Angio Abdomen and Pelvis w/ IV Cont:   ACC: [id number] EXAM:  CT ANGIO ABD PELV (W)AW IC                          PROCEDURE DATE:  12/25/2021          INTERPRETATION:  Clinical History / Reason for exam: GI bleed.    TECHNIQUE: Multislice helical sections were obtained from the domes of   the diaphragm to the pubic symphysis prior to and following intravenous   administration of 100 cc of Omnipaque 350, utilizing CT angiogram, GI   bleed protocol. Coronal and sagittal reformatted images and 3-D/MIP   reconstruction is performed.    COMPARISON: None.    FINDINGS:    LOWER CHEST: Bibasilar subsegmental atelectasis. ICD lead partially   visualized.    HEPATOBILIARY:  Unremarkable liver. Cholecystectomy.    SPLEEN: Unremarkable.    PANCREAS: Unremarkable.    ADRENAL GLANDS: Unremarkable.    KIDNEYS: Symmetric pattern of renal enhancement. No hydronephrosis.   Duplicated right renal collecting system. Postsurgical changes versus   scarring of the left lower pole. Subcentimeter renal hypodensities too   small to characterize    ABDOMINOPELVIC NODES: No abdominopelvic lymphadenopathy.    PELVIC ORGANS: Thickening and mucosal hyperenhancement of the urinary   bladder wall.    PERITONEUM /MESENTERY/BOWEL: Focus of hyperdensity in the lesser   curvature of the stomach just below thegastroesophageal junction on   arterial phase imaging which blooms on venous phase (series 7 image 97,   series 8 image 35, 46). Pooling hyperdensity noted within the gastric   lumen. Note is made of asymmetric thickening along the lesser curvature   with suggestion of focal outpouching in the region of active arterial   extravasation. No bowel obstruction, pneumoperitoneum, or ascites.    BONES/SOFT TISSUES:Degenerative changes of the visualized thoracal lumbar   spine.    OTHER: Calcified atherosclerotic disease of the aorta and branch vessels.   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    IMPRESSION:    Active arterial extravasation within the lesser curvature of the stomach,   just below the gastroesophageal junction.    *Asymmetric wall thickening along the lesser curvature with suggestion of   focal outpouching in the region of active arterial extravasation.   Findings may reflect active GI bleed secondary to ulcer disease.    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    Mucosal hyperenhancement and thickening of the urinary bladder wall;   correlate with urinalysis to exclude cystitis.    Dr. Pantera Clemente discussed preliminary findings with ABEL CARRILLO DO on 12/25/2021 1:57 AM with readback.    *Added to final report.    --- End of Report ---          PANTERA CLEMENTE MD; Resident Radiologist  This document has been electronically signed.  ANGEL VELASQUEZ MD; Attending Radiologist  This document has been electronically signed. Dec 25 2021  2:42AM (12-25-21 @ 01:50)

## 2021-12-25 NOTE — H&P ADULT - NSHPPHYSICALEXAM_GEN_ALL_CORE
PHYSICAL EXAM:  GENERAL: NAD, AAO x 4, 76y F  HEAD:  Atraumatic, Normocephalic  EYES: EOMI, conjunctiva and sclera white  NECK: Supple, No JVD  CHEST/LUNG: Clear to auscultation bilaterally; No wheeze; No crackles; No accessory muscles used  HEART: Regular rate and rhythm; No murmurs;   ABDOMEN: Soft, Nontender, Nondistended; Bowel sounds present; No guarding, No organomegaly  EXTREMITIES:  2+ Peripheral Pulses, No cyanosis or edema  NEUROLOGY: non-focal PHYSICAL EXAM:  GENERAL: NAD, AAO x 3, 76y F  HEAD:  Atraumatic, Normocephalic  EYES: EOMI, conjunctiva and sclera white  NECK: Supple, No JVD  CHEST/LUNG: Clear to auscultation bilaterally; No wheeze; No crackles; No accessory muscles used  HEART: tachycardia;+s1 s2;  No murmurs;   ABDOMEN: Soft, +generalized tenderness on palpation. Nondistended; Bowel sounds present; No guarding, No organomegaly  EXTREMITIES:  2+ Peripheral Pulses, No cyanosis or edema  NEUROLOGY: non-focal

## 2021-12-25 NOTE — CONSULT NOTE ADULT - CONSULT REASON
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
hematemesis

## 2021-12-25 NOTE — CONSULT NOTE ADULT - SUBJECTIVE AND OBJECTIVE BOX
VASCULAR SURGERY CONSULT NOTE      HPI:  Patient is 76 years old Czech speaking female with PMH of HIV( on HAART therapy, unknown CD4 count and viral load), breast cancer  s/p right lumpectomy, PE/DVT (3 years ago, on ASA, not currently on anticoagulation), PUD( 15 years ago), depression presents after several episodes vomiting after eating dinner with blood noted in emesis. Pt had dinner last night followed by multiple episodes of NBNB vomiting followed by 3-4 episodes of hematemesis more than cupful associated with epigastric discomfort. Pr reports taking Alleve daily.  Patient reported feeling dizzy, admits to watery nonbloody diarrhea. Had an EGD and colonoscopy 7 years ago at [place].   Denies fevers, chills, cough, CP, SOB, palpitations. Taking all meds as prescribed, however does not remember the names of her medications.     In ED, VS: /82  HR 84, RR 18 Spo2 99 on RA.  On Labs Hgb is stable 12.4 ( unknown baseline). .6. UA positive for leukocyte esterase and nitrite.  CELSO in ED showed brown stool; No gross blood or melena per rectum.  CT angio of abdomen showed active arterial extravasation within the lesser curvature of the stomach, just below the gastroesophageal junction; and Asymmetric wall thickening along the lesser curvature with suggestion of focal outpouching in the region of active arterial extravasation, reflecting active GI bleed secondary to ulcer disease.  Pt was started on protonix gtt, received Erythromycin 250mg IV once. Also was started on Ceftriaxone for UTI.   Pt was upgraded to ICU. S/P EGD with Gastroenterology.  PT denies any N/V, abdominal pain associated with food.      (25 Dec 2021 02:55)        PAST MEDICAL & SURGICAL HISTORY:  HIV (human immunodeficiency virus infection)    Pacemaker    Breast cancer    DVT, lower extremity    Pulmonary embolism    GI bleed    Depression      No Known Allergies    Home Medications:  aspirin 81 mg oral delayed release tablet: 1 tab(s) orally once a day (25 Dec 2021 05:45)  Biktarvy oral tablet: 1 tab(s) orally once a day (25 Dec 2021 04:38)  brimonidine 0.2% ophthalmic solution: 1 drop(s) to each affected eye 3 times a day (25 Dec 2021 04:42)  citalopram 20 mg oral tablet: 1 tab(s) orally once a day (25 Dec 2021 04:38)  latanoprost 0.005% ophthalmic solution: 1 drop(s) to each affected eye once a day (in the evening) (25 Dec 2021 04:43)  losartan 100 mg oral tablet: 1 tab(s) orally once a day (25 Dec 2021 04:37)  montelukast 10 mg oral tablet: 1 tab(s) orally once a day (25 Dec 2021 04:42)  rosuvastatin 20 mg oral tablet: 1 tab(s) orally once a day (25 Dec 2021 04:37)    No permtinent family history of PVD    REVIEW OF SYSTEMS:  GENERAL:                                         negative  SKIN:                                                 negative  OPTHALMOLOGIC:                          negative  ENMT:                                               negative  RESPIRATORY AND THORAX:        negative  CARDIOVASCULAR:                         negative  GASTROINTESTINAL:                       negative  NEPHROLOGY:                                  negative  MUSCULOSKELETAL:                       negative  NEUROLOGIC:                                   negative  PSYCHIATRIC:                                    negative  HEMATOLOGY/LYMPHATICS:         negative  ENDOCRINE:                                     negative  ALLERGIC/IMMUNOLOGIC:            negative    12 point ROS otherwise normal except as stated in HPI    PHYSICAL EXAM  Vital Signs Last 24 Hrs  T(C): 36.4 (25 Dec 2021 12:30), Max: 36.8 (24 Dec 2021 22:05)  T(F): 97.5 (25 Dec 2021 12:30), Max: 98.3 (24 Dec 2021 23:15)  HR: 82 (25 Dec 2021 15:00) (80 - 136)  BP: 125/51 (25 Dec 2021 15:00) (88/54 - 192/82)  BP(mean): 70 (25 Dec 2021 15:00) (70 - 100)  RR: 13 (25 Dec 2021 15:00) (12 - 39)  SpO2: 100% (25 Dec 2021 15:00) (91% - 100%)    Appearance: Normal	  HEENT:   Normal oral mucosa, PERRL, EOMI	  Neck: Supple, - JVD; Carotid Bruit   Cardiovascular: Normal S1 S2, No JVD, No murmurs,   Respiratory: Lungs clear to auscultation, No Rales, Rhonchi, Wheezing	  Gastrointestinal:  Soft, Non-tender, No rebound tenderness or guarding,  positive BS	  Skin: No rashes, No ecchymoses, No cyanosis  Extremities: Normal range of motion, No clubbing, cyanosis or edema  Vascular: Peripheral pulses palpable 2+ bilaterally  Neurologic: Non-focal  Psychiatry: A & O x 3, Mood & affect appropriate      MEDICATIONS:   MEDICATIONS  (STANDING):  bictegravir 50 mG/emtricitabine 200 mG/tenofovir alafenamide 25 mG (BIKTARVY) 1 Tablet(s) Oral daily  brimonidine 0.2% Ophthalmic Solution 1 Drop(s) Both EYES three times a day  cefTRIAXone   IVPB 1000 milliGRAM(s) IV Intermittent every 24 hours  chlorhexidine 4% Liquid 1 Application(s) Topical <User Schedule>  citalopram 20 milliGRAM(s) Oral daily  lactated ringers. 1000 milliLiter(s) (75 mL/Hr) IV Continuous <Continuous>  montelukast 10 milliGRAM(s) Oral daily  pantoprazole Infusion 8 mG/Hr (10 mL/Hr) IV Continuous <Continuous>    MEDICATIONS  (PRN):      LAB/STUDIES:                        11.2   10.07 )-----------( 121      ( 25 Dec 2021 12:09 )             33.8     12    139  |  107  |  33<H>  ----------------------------<  125<H>  4.5   |  20  |  0.7    Ca    9.2      25 Dec 2021 04:30  Mg     1.8         TPro  5.6<L>  /  Alb  3.6  /  TBili  0.5  /  DBili  x   /  AST  17  /  ALT  19  /  AlkPhos  37  12-25    PT/INR - ( 25 Dec 2021 04:33 )   PT: 12.50 sec;   INR: 1.09 ratio         PTT - ( 25 Dec 2021 04:33 )  PTT:28.6 sec  LIVER FUNCTIONS - ( 25 Dec 2021 04:30 )  Alb: 3.6 g/dL / Pro: 5.6 g/dL / ALK PHOS: 37 U/L / ALT: 19 U/L / AST: 17 U/L / GGT: x             Urinalysis Basic - ( 24 Dec 2021 23:03 )    Color: Yellow / Appearance: Slightly Turbid / S.015 / pH: x  Gluc: x / Ketone: Small  / Bili: Negative / Urobili: <2 mg/dL   Blood: x / Protein: 30 mg/dL / Nitrite: Positive   Leuk Esterase: Large / RBC: 11 /HPF / WBC 57 /HPF   Sq Epi: x / Non Sq Epi: 2 /HPF / Bacteria: Many    IMAGING:    < from: CT Angio Abdomen and Pelvis w/ IV Cont (. @ 01:50) >  IMPRESSION:    Active arterial extravasation within the lesser curvature of the stomach,   just below the gastroesophageal junction.    *Asymmetric wall thickening along the lesser curvature with suggestion of   focal outpouching in the region of active arterial extravasation.   Findings may reflect active GI bleed secondary to ulcer disease.    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    Mucosal hyperenhancement and thickening of the urinary bladder wall;   correlate with urinalysis to exclude cystitis.

## 2021-12-25 NOTE — H&P ADULT - NSHPLABSRESULTS_GEN_ALL_CORE
Labs:                        12.4   7.88  )-----------( 135      ( 24 Dec 2021 23:25 )             37.2           142  |  104  |  25<H>  ----------------------------<  120<H>  4.3   |  24  |  0.9    Ca    10.4<H>      24 Dec 2021 23:25    TPro  6.6  /  Alb  4.2  /  TBili  0.5  /  DBili  x   /  AST  23  /  ALT  24  /  AlkPhos  45                Urinalysis Basic - ( 24 Dec 2021 23:03 )    Color: Yellow / Appearance: Slightly Turbid / S.015 / pH: x  Gluc: x / Ketone: Small  / Bili: Negative / Urobili: <2 mg/dL   Blood: x / Protein: 30 mg/dL / Nitrite: Positive   Leuk Esterase: Large / RBC: x / WBC x   Sq Epi: x / Non Sq Epi: x / Bacteria: x            Lactate Trend   @ 23:25 Lactate:1.2             CAPILLARY BLOOD GLUCOSE      Radiology:  < from: CT Angio Abdomen and Pelvis w/ IV Cont (21 @ 01:50) >    IMPRESSION:    Active arterial extravasation within the lesser curvature of the stomach,   just below the gastroesophageal junction.    *Asymmetric wall thickening along the lesser curvature with suggestion of   focal outpouching in the region of active arterial extravasation.   Findings may reflect active GI bleed secondary to ulcer disease.    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    Mucosal hyperenhancement and thickening of the urinary bladder wall;   correlate with urinalysis to exclude cystitis.    < end of copied text >

## 2021-12-25 NOTE — PATIENT PROFILE ADULT - FALL HARM RISK - UNIVERSAL INTERVENTIONS
Bed in lowest position, wheels locked, appropriate side rails in place/Call bell, personal items and telephone in reach/Instruct patient to call for assistance before getting out of bed or chair/Non-slip footwear when patient is out of bed/[place] to call system/Physically safe environment - no spills, clutter or unnecessary equipment/Purposeful Proactive Rounding/Room/bathroom lighting operational, light cord in reach

## 2021-12-25 NOTE — CONSULT NOTE ADULT - ASSESSMENT
#)Upper GI Bleed: DD includes likely PUD vs less likely esophageal varices vs AVM vs esophageal ulcer vs dieualfoy vs malignancy  Hemodynamically stable   No Basline Hb, Current Hb 12.9  CELSO Brown stool  only on apsirin   h/o alleve intake every day    Rec:  Keep NPO   Maintain active type and screen.  Trend Hb q8hrs and Keep it > 8, transfuse PRBC if necessary  Adequate Fluid resuscitation (SBP > 90)  IV PPI drip  Erythromycin 250mg IV once  COVID pending   Will plan for EGD in AM     76 yold female to ED Pmhx Hiv +(?cdv/viral load) on [facility] tx, ppm, dvt/pe 3 yrs ago currently only on asa, hx gi bleed, Breast cancer s/p right lumpectomy, depression; pt Syriac speaking family at bed side intrep on pt request - c/o dizzness/lightheaded with n/v/diarrhea started tonight after dinner; family states pt vomitted blood; brown stool no gross blood or melena  Had dinner last night followed by multiple episodes of NBNB vomiting followed by 3-4 episodes of hematemesis more than cupful associated with epigastric discomfort. h/o alleve intake every day. Had an EGD and colonospy 7 years ago at [place]. h/o PUD 15 years ago.     #)Upper GI Bleed: DD includes likely PUD vs less likely esophageal varices vs AVM vs esophageal ulcer vs dieualfoy vs malignancy  Hemodynamically stable   No Basline Hb, Current Hb 12.9  CELSO Brown stool  only on apsirin   h/o alleve intake every day    Rec:  Admit to ICU  Keep NPO   Maintain active type and screen.  Trend Hb q8hrs and Keep it > 8, transfuse PRBC if necessary  Adequate Fluid resuscitation (SBP > 90)  IV PPI drip  Erythromycin 250mg IV once  COVID pending   Will plan for EGD in AM

## 2021-12-25 NOTE — H&P ADULT - HISTORY OF PRESENT ILLNESS
Patient is 76 years old Dominican speaking female with PMH of HIV( on HAART therapy, unknown CD4 count and viral load), breast cancer  s/p right lumpectomy, PE/DVT (3 years ago, not currently on anticoagulation), PUD( 15 years ago), depression presents after several episodes vomiting after eating dinner with blood noted in emesis. Pt had dinner last night followed by multiple episodes of NBNB vomiting followed by 3-4 episodes of hematemesis more than cupful associated with epigastric discomfort. Pr reports taking Alleve daily.  Patient reported feeling dizzy, admits to watery nonbloody diarrhea. Had an EGD and colonoscopy 7 years ago at [place].   Denies fevers, chills, cough, CP, SOB, palpitations. Taking all meds as prescribed.    In ED, VS: /82  HR 84, RR 18 Spo2 99 on RA.  On Labs Hgb is stable 12.4 ( unknown baseline). .6. UA positive for leukocyte esterase and nitrite.  CELSO in ED showed brown stool; No gross blood or melena per rectum.  CT angio of abdomen showed active arterial extravasation within the lesser curvature of the stomach, just below the gastroesophageal junction; and Asymmetric wall thickening along the lesser curvature with suggestion of focal outpouching in the region of active arterial extravasation, reflecting active GI bleed secondary to ulcer disease.  Pt was started on protonix gtt, received Erythromycin 250mg IV once. Also was started on Ceftriaxone for UTI.   Pt was seen by GI, recommended upgrade to ICU with plan for EGD in AM.     Patient is 76 years old Micronesian speaking female with PMH of HIV( on HAART therapy, unknown CD4 count and viral load), breast cancer  s/p right lumpectomy, PE/DVT (3 years ago, not currently on anticoagulation), PUD( 15 years ago), depression presents after several episodes vomiting after eating dinner with blood noted in emesis. Pt had dinner last night followed by multiple episodes of NBNB vomiting followed by 3-4 episodes of hematemesis more than cupful associated with epigastric discomfort. Pr reports taking Alleve daily.  Patient reported feeling dizzy, admits to watery nonbloody diarrhea. Had an EGD and colonoscopy 7 years ago at [place].   Denies fevers, chills, cough, CP, SOB, palpitations. Taking all meds as prescribed, however does not remember the names of her medications.     In ED, VS: /82  HR 84, RR 18 Spo2 99 on RA.  On Labs Hgb is stable 12.4 ( unknown baseline). .6. UA positive for leukocyte esterase and nitrite.  CELSO in ED showed brown stool; No gross blood or melena per rectum.  CT angio of abdomen showed active arterial extravasation within the lesser curvature of the stomach, just below the gastroesophageal junction; and Asymmetric wall thickening along the lesser curvature with suggestion of focal outpouching in the region of active arterial extravasation, reflecting active GI bleed secondary to ulcer disease.  Pt was started on protonix gtt, received Erythromycin 250mg IV once. Also was started on Ceftriaxone for UTI.   Pt was seen by GI, recommended upgrade to ICU with plan for EGD in AM.     Patient is 76 years old Jordanian speaking female with PMH of HIV( on HAART therapy, unknown CD4 count and viral load), breast cancer  s/p right lumpectomy, PE/DVT (3 years ago, on ASA, not currently on anticoagulation), PUD( 15 years ago), depression presents after several episodes vomiting after eating dinner with blood noted in emesis. Pt had dinner last night followed by multiple episodes of NBNB vomiting followed by 3-4 episodes of hematemesis more than cupful associated with epigastric discomfort. Pr reports taking Alleve daily.  Patient reported feeling dizzy, admits to watery nonbloody diarrhea. Had an EGD and colonoscopy 7 years ago at [place].   Denies fevers, chills, cough, CP, SOB, palpitations. Taking all meds as prescribed, however does not remember the names of her medications.     In ED, VS: /82  HR 84, RR 18 Spo2 99 on RA.  On Labs Hgb is stable 12.4 ( unknown baseline). .6. UA positive for leukocyte esterase and nitrite.  CELSO in ED showed brown stool; No gross blood or melena per rectum.  CT angio of abdomen showed active arterial extravasation within the lesser curvature of the stomach, just below the gastroesophageal junction; and Asymmetric wall thickening along the lesser curvature with suggestion of focal outpouching in the region of active arterial extravasation, reflecting active GI bleed secondary to ulcer disease.  Pt was started on protonix gtt, received Erythromycin 250mg IV once. Also was started on Ceftriaxone for UTI.   Pt was seen by GI, recommended upgrade to ICU with plan for EGD in AM.

## 2021-12-25 NOTE — CHART NOTE - NSCHARTNOTEFT_GEN_A_CORE
EGD findings:   Esophagus Excavated lesions: A Matilda-Jiménez tear with stigmata of recent bleeding blood clot was seen in the gastroesophageal junction. Three endoclips were successfully applied for the purpose of hemostasis.   Stomach Mucosa : Normal mucosa was noted in the whole stomach. No evidence of active bleeding was noted in the stomach.   Duodenum Mucosa:  Normal mucosa was noted in the whole examined duodenum. No evidence of active bleeding in the duodenum.     After performing EGD after extubation she had large rectal bleed with hypotension. Received one unit PRBC in OR.  After evaluation decision was taken to reintubated and performed second look EGD. EGD confirmed the placement of clips and no evidence of active bleeding in the esophagus, stomach , duodenum. Likely rectal bleed was old blood.     Recs:   NPO  IV PPI drip   Trend CBC BID, keep Hb>8  Active type and screen   ICU monitoring   If any further episodes of bleeding with hypotension pls get CTA and IR consult   will follow EGD findings:   Esophagus Excavated lesions: A Matilda-Jiménez tear with stigmata of recent bleeding blood clot was seen in the gastroesophageal junction. Three endoclips were successfully applied for the purpose of hemostasis.   Stomach Mucosa : Normal mucosa was noted in the whole stomach. No evidence of active bleeding was noted in the stomach.   Duodenum Mucosa:  Normal mucosa was noted in the whole examined duodenum. No evidence of active bleeding in the duodenum.     After performing EGD after extubation she had large rectal bleed with hypotension. Received one unit PRBC in OR.  After evaluation decision was taken to reintubated and performed second look EGD. EGD confirmed the placement of clips and no evidence of active bleeding in the esophagus, stomach , duodenum. Likely rectal bleed was old blood.     Recs:   NPO  IV PPI drip   Trend CBC BID, keep Hb>8  Active type and screen   ICU monitoring   If any further episodes of bleeding with hypotension pls get CTA and IR consult     Spoke to the son and updated about the findings;  will follow

## 2021-12-25 NOTE — CHART NOTE - NSCHARTNOTEFT_GEN_A_CORE
PACU ANESTHESIA ADMISSION NOTE      Procedure:   Post op diagnosis:      ____  Intubated  TV:______       Rate: ______      FiO2: ______    __x__  Patent Airway    __x__  Full return of protective reflexes    __x__  Full recovery from anesthesia / back to baseline     Vitals:   T:  97.5        R:  16                BP:   89/53               Sat:  100%                 P: 85      Mental Status:  __x__ Awake   __x___ Alert   _____ Drowsy   _____ Sedated    Nausea/Vomiting:  __x__ NO  ______Yes,   See Post - Op Orders          Pain Scale (0-10):  __0___    Treatment: ____ None    ___x_ See Post - Op/PCA Orders    Post - Operative Fluids:   ____ Oral   __x__ See Post - Op Orders    Plan: Discharge:   ____Home       __x___Floor     _____Critical Care    _____  Other:_________________    Comments:  pt tolerated procedure well, pt transferred to PACU and report was given to PACU RN, vital signs are stable and pt shows no signs of distress. no anesthesia complications, transfer to floor when criteria met.

## 2021-12-25 NOTE — H&P ADULT - ASSESSMENT
Patient is 76 years old Liberian speaking female with PMH of HIV( on HAART therapy, unknown CD4 count and viral load), breast cancer  s/p right lumpectomy, PE/DVT (3 years ago, not currently on anticoagulation), PUD( 15 years ago), depression presents after several episodes vomiting after eating dinner with blood noted in emesis. Pt had dinner last night followed by multiple episodes of NBNB vomiting followed by 3-4 episodes of hematemesis more than cupful associated with epigastric discomfort. Pr reports taking Alleve daily.  Patient reported feeling dizzy, admits to watery nonbloody diarrhea. Had an EGD and colonoscopy 7 years ago at [place].   Denies fevers, chills, cough, CP, SOB, palpitations. Taking all meds as prescribed.    IMPRESSIONS:  Upper GI bleed secondary to most likely PUD vs less likely esophageal varices vs AVM vs esophageal ulcer    PLAN:    CNS: avoid sedation    ENT: oral care, head of bed elevation     CARDIOVASCULAR: Adequate Fluid resuscitation (SBP > 90)    PULMONARY: aspirations precautions     GASTROINTESTINAL: Keep NPO. Maintain active type and screen. Trend Hb q8hrs and Keep it > 8, transfuse PRBC if necessary. c/w IV PPI drip. s/p Erythromycin 250mg IV once. planed for EGD in AM    RENAL: monitor renal output, electrolytes     INFECTIOUS DISEASE: UA + leukocyte esterase and nitrite.  s/p Erythromycin 250mg IV once. c/w ceftriaxone 1 gr Q24H, f/u urine cx    ENDOCRINE:     HEMATOLOGY: Maintain active type and screen. Trend Hb q8hrs and Keep it > 8, transfuse PRBC if necessary      CODE STATUS: Full code  DVT PPX: SCD  DISPO: ICU Patient is 76 years old Cape Verdean speaking female with PMH of HIV( on HAART therapy, unknown CD4 count and viral load), breast cancer  s/p right lumpectomy, PE/DVT (3 years ago, not currently on anticoagulation), PUD( 15 years ago), depression presents after several episodes vomiting after eating dinner with blood noted in emesis. Pt had dinner last night followed by multiple episodes of NBNB vomiting followed by 3-4 episodes of hematemesis more than cupful associated with epigastric discomfort. Pr reports taking Alleve daily.  Patient reported feeling dizzy, admits to watery nonbloody diarrhea. Had an EGD and colonoscopy 7 years ago at [place].   Denies fevers, chills, cough, CP, SOB, palpitations. Taking all meds as prescribed.    IMPRESSIONS:  Upper GI bleed secondary to most likely PUD vs less likely esophageal varices vs AVM vs esophageal ulcer    PLAN:    CNS: avoid sedation    ENT: oral care, HOB 45 deg,    CARDIOVASCULAR: Adequate Fluid resuscitation (SBP > 90)    PULMONARY: aspirations precautions     GASTROINTESTINAL: Keep NPO. Maintain active type and screen. Trend Hb q8hrs and Keep it > 8, transfuse PRBC if necessary. c/w IV PPI drip. s/p Erythromycin 250mg IV once. planed for EGD in AM    RENAL: monitor renal output, electrolytes     INFECTIOUS DISEASE: UA + leukocyte esterase and nitrite.  s/p Erythromycin 250mg IV once. c/w ceftriaxone 1 gr Q24H, f/u urine cx. Check HIV, CD4 count , viral load    ENDOCRINE: Check A1C, check TSH    HEMATOLOGY: Maintain active type and screen. Trend Hb q8hrs and Keep it > 8, transfuse PRBC if necessary. check B12 and folate      CODE STATUS: Full code  DVT PPX: SCD  DISPO: ICU     Patient is 76 years old Nepalese speaking female with PMH of HIV( on HAART therapy, unknown CD4 count and viral load), breast cancer  s/p right lumpectomy, PE/DVT (3 years ago, not currently on anticoagulation), PUD( 15 years ago), depression presents after several episodes vomiting after eating dinner with blood noted in emesis. Pt had dinner last night followed by multiple episodes of NBNB vomiting followed by 3-4 episodes of hematemesis more than cupful associated with epigastric discomfort. Pr reports taking Alleve daily.  Patient reported feeling dizzy, admits to watery nonbloody diarrhea. Had an EGD and colonoscopy 7 years ago at [place].     IMPRESSIONS:  Upper GI bleed secondary to most likely PUD vs less likely esophageal varices vs AVM vs esophageal ulcer    PLAN:    CNS: avoid sedation    ENT: oral care, HOB 45 deg,    CARDIOVASCULAR: Adequate Fluid resuscitation (SBP > 90), c/w home medications    PULMONARY: aspirations precautions     GASTROINTESTINAL: Keep NPO. Maintain active type and screen. Trend Hb q8hrs and Keep it > 8, transfuse PRBC if necessary. c/w IV PPI drip. s/p Erythromycin 250mg IV once. planed for EGD in AM    RENAL: monitor renal output, electrolytes     INFECTIOUS DISEASE: UA + leukocyte esterase and nitrite.  s/p Erythromycin 250mg IV once. c/w ceftriaxone 1 gr Q24H, f/u urine cx. Check HIV, CD4 count , viral load    ENDOCRINE: Check A1C, check TSH    HEMATOLOGY: Maintain active type and screen. Trend Hb q8hrs and Keep it > 8, transfuse PRBC if necessary. check B12 and folate      CODE STATUS: Full code  DVT PPX: SCD  DISPO: ICU    Attempted to call Gee White twice on [phone], no answer.    Patient is 76 years old Moldovan speaking female with PMH of HIV( on HAART therapy, unknown CD4 count and viral load), breast cancer  s/p right lumpectomy, PE/DVT (3 years ago, on ASA, not currently on anticoagulation), PUD( 15 years ago), depression presents after several episodes vomiting after eating dinner with blood noted in emesis. Pt had dinner last night followed by multiple episodes of NBNB vomiting followed by 3-4 episodes of hematemesis more than cupful associated with epigastric discomfort. Pr reports taking Alleve daily.  Patient reported feeling dizzy, admits to watery nonbloody diarrhea. Had an EGD and colonoscopy 7 years ago at [place].     IMPRESSIONS:  Upper GI bleed secondary to most likely PUD vs less likely esophageal varices vs AVM vs esophageal ulcer    PLAN:    CNS: avoid sedation    ENT: oral care, HOB 45 deg,    CARDIOVASCULAR: Adequate Fluid resuscitation (SBP > 90), c/w home medications    PULMONARY: aspirations precautions     GASTROINTESTINAL: Keep NPO. Maintain active type and screen. Trend Hb q8hrs and Keep it > 8, transfuse PRBC if necessary. c/w IV PPI drip. s/p Erythromycin 250mg IV once. planed for EGD in AM    RENAL: monitor renal output, electrolytes     INFECTIOUS DISEASE: UA + leukocyte esterase and nitrite.  s/p Erythromycin 250mg IV once. c/w ceftriaxone 1 gr Q24H, f/u urine cx. Check HIV, CD4 count , viral load    ENDOCRINE: Check A1C, check TSH    HEMATOLOGY: Maintain active type and screen. Trend Hb q8hrs and Keep it > 8, transfuse PRBC if necessary. check B12 and folate      CODE STATUS: Full code  DVT PPX: SCD  DISPO: ICU    Spoke to daughter-in-law Yana Olmstead, updated her about pt's medical status and medical plan.

## 2021-12-26 LAB
ALBUMIN SERPL ELPH-MCNC: 3.3 G/DL — LOW (ref 3.5–5.2)
ALP SERPL-CCNC: 37 U/L — SIGNIFICANT CHANGE UP (ref 30–115)
ALT FLD-CCNC: 16 U/L — SIGNIFICANT CHANGE UP (ref 0–41)
ANION GAP SERPL CALC-SCNC: 13 MMOL/L — SIGNIFICANT CHANGE UP (ref 7–14)
AST SERPL-CCNC: 18 U/L — SIGNIFICANT CHANGE UP (ref 0–41)
BASOPHILS # BLD AUTO: 0.02 K/UL — SIGNIFICANT CHANGE UP (ref 0–0.2)
BASOPHILS # BLD AUTO: 0.03 K/UL — SIGNIFICANT CHANGE UP (ref 0–0.2)
BASOPHILS NFR BLD AUTO: 0.3 % — SIGNIFICANT CHANGE UP (ref 0–1)
BASOPHILS NFR BLD AUTO: 0.5 % — SIGNIFICANT CHANGE UP (ref 0–1)
BILIRUB SERPL-MCNC: 0.7 MG/DL — SIGNIFICANT CHANGE UP (ref 0.2–1.2)
BUN SERPL-MCNC: 18 MG/DL — SIGNIFICANT CHANGE UP (ref 10–20)
CALCIUM SERPL-MCNC: 8.8 MG/DL — SIGNIFICANT CHANGE UP (ref 8.5–10.1)
CHLORIDE SERPL-SCNC: 108 MMOL/L — SIGNIFICANT CHANGE UP (ref 98–110)
CO2 SERPL-SCNC: 19 MMOL/L — SIGNIFICANT CHANGE UP (ref 17–32)
CREAT SERPL-MCNC: 0.6 MG/DL — LOW (ref 0.7–1.5)
EOSINOPHIL # BLD AUTO: 0.1 K/UL — SIGNIFICANT CHANGE UP (ref 0–0.7)
EOSINOPHIL # BLD AUTO: 0.1 K/UL — SIGNIFICANT CHANGE UP (ref 0–0.7)
EOSINOPHIL NFR BLD AUTO: 1.5 % — SIGNIFICANT CHANGE UP (ref 0–8)
EOSINOPHIL NFR BLD AUTO: 1.6 % — SIGNIFICANT CHANGE UP (ref 0–8)
GLUCOSE SERPL-MCNC: 108 MG/DL — HIGH (ref 70–99)
HCT VFR BLD CALC: 30.7 % — LOW (ref 37–47)
HCT VFR BLD CALC: 31 % — LOW (ref 37–47)
HGB BLD-MCNC: 10.3 G/DL — LOW (ref 12–16)
HGB BLD-MCNC: 10.5 G/DL — LOW (ref 12–16)
IMM GRANULOCYTES NFR BLD AUTO: 0.2 % — SIGNIFICANT CHANGE UP (ref 0.1–0.3)
IMM GRANULOCYTES NFR BLD AUTO: 0.5 % — HIGH (ref 0.1–0.3)
LYMPHOCYTES # BLD AUTO: 1.28 K/UL — SIGNIFICANT CHANGE UP (ref 1.2–3.4)
LYMPHOCYTES # BLD AUTO: 1.61 K/UL — SIGNIFICANT CHANGE UP (ref 1.2–3.4)
LYMPHOCYTES # BLD AUTO: 20.6 % — SIGNIFICANT CHANGE UP (ref 20.5–51.1)
LYMPHOCYTES # BLD AUTO: 24.5 % — SIGNIFICANT CHANGE UP (ref 20.5–51.1)
MAGNESIUM SERPL-MCNC: 1.9 MG/DL — SIGNIFICANT CHANGE UP (ref 1.8–2.4)
MCHC RBC-ENTMCNC: 33.6 G/DL — SIGNIFICANT CHANGE UP (ref 32–37)
MCHC RBC-ENTMCNC: 33.8 PG — HIGH (ref 27–31)
MCHC RBC-ENTMCNC: 33.9 G/DL — SIGNIFICANT CHANGE UP (ref 32–37)
MCHC RBC-ENTMCNC: 34.1 PG — HIGH (ref 27–31)
MCV RBC AUTO: 100.6 FL — HIGH (ref 81–99)
MCV RBC AUTO: 100.7 FL — HIGH (ref 81–99)
MONOCYTES # BLD AUTO: 0.6 K/UL — SIGNIFICANT CHANGE UP (ref 0.1–0.6)
MONOCYTES # BLD AUTO: 0.69 K/UL — HIGH (ref 0.1–0.6)
MONOCYTES NFR BLD AUTO: 10.5 % — HIGH (ref 1.7–9.3)
MONOCYTES NFR BLD AUTO: 9.7 % — HIGH (ref 1.7–9.3)
NEUTROPHILS # BLD AUTO: 4.11 K/UL — SIGNIFICANT CHANGE UP (ref 1.4–6.5)
NEUTROPHILS # BLD AUTO: 4.18 K/UL — SIGNIFICANT CHANGE UP (ref 1.4–6.5)
NEUTROPHILS NFR BLD AUTO: 62.7 % — SIGNIFICANT CHANGE UP (ref 42.2–75.2)
NEUTROPHILS NFR BLD AUTO: 67.4 % — SIGNIFICANT CHANGE UP (ref 42.2–75.2)
NRBC # BLD: 0 /100 WBCS — SIGNIFICANT CHANGE UP (ref 0–0)
NRBC # BLD: 0 /100 WBCS — SIGNIFICANT CHANGE UP (ref 0–0)
PLATELET # BLD AUTO: 100 K/UL — LOW (ref 130–400)
PLATELET # BLD AUTO: 102 K/UL — LOW (ref 130–400)
POTASSIUM SERPL-MCNC: 3.8 MMOL/L — SIGNIFICANT CHANGE UP (ref 3.5–5)
POTASSIUM SERPL-SCNC: 3.8 MMOL/L — SIGNIFICANT CHANGE UP (ref 3.5–5)
PROT SERPL-MCNC: 5.1 G/DL — LOW (ref 6–8)
RBC # BLD: 3.05 M/UL — LOW (ref 4.2–5.4)
RBC # BLD: 3.08 M/UL — LOW (ref 4.2–5.4)
RBC # FLD: 14.6 % — HIGH (ref 11.5–14.5)
RBC # FLD: 14.8 % — HIGH (ref 11.5–14.5)
SODIUM SERPL-SCNC: 140 MMOL/L — SIGNIFICANT CHANGE UP (ref 135–146)
WBC # BLD: 6.2 K/UL — SIGNIFICANT CHANGE UP (ref 4.8–10.8)
WBC # BLD: 6.56 K/UL — SIGNIFICANT CHANGE UP (ref 4.8–10.8)
WBC # FLD AUTO: 6.2 K/UL — SIGNIFICANT CHANGE UP (ref 4.8–10.8)
WBC # FLD AUTO: 6.56 K/UL — SIGNIFICANT CHANGE UP (ref 4.8–10.8)

## 2021-12-26 PROCEDURE — 99232 SBSQ HOSP IP/OBS MODERATE 35: CPT

## 2021-12-26 PROCEDURE — 99233 SBSQ HOSP IP/OBS HIGH 50: CPT

## 2021-12-26 PROCEDURE — 71045 X-RAY EXAM CHEST 1 VIEW: CPT | Mod: 26

## 2021-12-26 RX ORDER — ASPIRIN/CALCIUM CARB/MAGNESIUM 324 MG
81 TABLET ORAL DAILY
Refills: 0 | Status: DISCONTINUED | OUTPATIENT
Start: 2021-12-26 | End: 2021-12-27

## 2021-12-26 RX ORDER — PANTOPRAZOLE SODIUM 20 MG/1
40 TABLET, DELAYED RELEASE ORAL
Refills: 0 | Status: DISCONTINUED | OUTPATIENT
Start: 2021-12-26 | End: 2021-12-27

## 2021-12-26 RX ORDER — PANTOPRAZOLE SODIUM 20 MG/1
8 TABLET, DELAYED RELEASE ORAL
Qty: 80 | Refills: 0 | Status: DISCONTINUED | OUTPATIENT
Start: 2021-12-26 | End: 2021-12-26

## 2021-12-26 RX ORDER — ENOXAPARIN SODIUM 100 MG/ML
40 INJECTION SUBCUTANEOUS DAILY
Refills: 0 | Status: DISCONTINUED | OUTPATIENT
Start: 2021-12-26 | End: 2021-12-27

## 2021-12-26 RX ADMIN — PANTOPRAZOLE SODIUM 40 MILLIGRAM(S): 20 TABLET, DELAYED RELEASE ORAL at 17:39

## 2021-12-26 RX ADMIN — BICTEGRAVIR SODIUM, EMTRICITABINE, AND TENOFOVIR ALAFENAMIDE FUMARATE 1 TABLET(S): 30; 120; 15 TABLET ORAL at 11:23

## 2021-12-26 RX ADMIN — BRIMONIDINE TARTRATE 1 DROP(S): 2 SOLUTION/ DROPS OPHTHALMIC at 13:46

## 2021-12-26 RX ADMIN — CEFTRIAXONE 100 MILLIGRAM(S): 500 INJECTION, POWDER, FOR SOLUTION INTRAMUSCULAR; INTRAVENOUS at 21:31

## 2021-12-26 RX ADMIN — Medication 1 SPRAY(S): at 13:45

## 2021-12-26 RX ADMIN — CHLORHEXIDINE GLUCONATE 1 APPLICATION(S): 213 SOLUTION TOPICAL at 05:45

## 2021-12-26 RX ADMIN — PANTOPRAZOLE SODIUM 10 MG/HR: 20 TABLET, DELAYED RELEASE ORAL at 07:19

## 2021-12-26 RX ADMIN — MONTELUKAST 10 MILLIGRAM(S): 4 TABLET, CHEWABLE ORAL at 11:22

## 2021-12-26 RX ADMIN — ENOXAPARIN SODIUM 40 MILLIGRAM(S): 100 INJECTION SUBCUTANEOUS at 11:22

## 2021-12-26 RX ADMIN — CITALOPRAM 20 MILLIGRAM(S): 10 TABLET, FILM COATED ORAL at 11:22

## 2021-12-26 RX ADMIN — BRIMONIDINE TARTRATE 1 DROP(S): 2 SOLUTION/ DROPS OPHTHALMIC at 05:44

## 2021-12-26 RX ADMIN — BRIMONIDINE TARTRATE 1 DROP(S): 2 SOLUTION/ DROPS OPHTHALMIC at 21:31

## 2021-12-26 RX ADMIN — Medication 1 SPRAY(S): at 21:30

## 2021-12-26 RX ADMIN — Medication 81 MILLIGRAM(S): at 11:23

## 2021-12-26 NOTE — PROGRESS NOTE ADULT - SUBJECTIVE AND OBJECTIVE BOX
Patient is a 76y old  Female who presents with a chief complaint of GI bleed (26 Dec 2021 08:49)        Over Night Events:    No events overnight.      ROS:  See HPI    PHYSICAL EXAM    ICU Vital Signs Last 24 Hrs  T(C): 36.9 (26 Dec 2021 08:00), Max: 36.9 (26 Dec 2021 08:00)  T(F): 98.5 (26 Dec 2021 08:00), Max: 98.5 (26 Dec 2021 08:00)  HR: 80 (26 Dec 2021 08:00) (76 - 94)  BP: 148/67 (26 Dec 2021 08:00) (88/54 - 158/72)  BP(mean): 88 (26 Dec 2021 08:00) (62 - 106)  ABP: --  ABP(mean): --  RR: 15 (26 Dec 2021 08:00) (13 - 40)  SpO2: 97% (26 Dec 2021 08:00) (91% - 100%)      21 @ 07:01  -  21 @ 07:00  --------------------------------------------------------  IN:    IV PiggyBack: 100 mL    Lactated Ringers: 1425 mL    Pantoprazole: 170 mL    Pantoprazole: 40 mL  Total IN: 1735 mL    OUT:    Stool (mL): 300 mL    Voided (mL): 1850 mL  Total OUT: 2150 mL    Total NET: -415 mL            CONSTITUTIONAL:  In NAD.    ENT:   Airway patent,   No thrush    EYES:   Clear bilaterally,   pupils equal,   round and reactive to light.    CARDIAC:   Normal rate,   regular rhythm.    no edema      CAROTID:   normal systolic impulse  no bruits    RESPIRATORY:   No wheezing  Normal chest expansion  Not tachypneic,  No use of accessory muscles    GASTROINTESTINAL:  Abdomen soft,   non-tender,   no guarding,   + BS    MUSCULOSKELETAL:   range of motion is not limited,  no clubbing, cyanosis    NEUROLOGICAL:   Alert and oriented   no motor deficits.    SKIN:   Skin normal color for race,   No evidence of rash.    PSYCHIATRIC:   normal mood and affect.   no apparent risk to self or others.        LABS:                            10.3   6.20  )-----------( 100      ( 26 Dec 2021 04:46 )             30.7                                                   140  |  108  |  18  ----------------------------<  108<H>  3.8   |  19  |  0.6<L>    Ca    8.8      26 Dec 2021 04:46  Mg     1.9         TPro  5.1<L>  /  Alb  3.3<L>  /  TBili  0.7  /  DBili  x   /  AST  18  /  ALT  16  /  AlkPhos  37        PT/INR - ( 25 Dec 2021 04:33 )   PT: 12.50 sec;   INR: 1.09 ratio         PTT - ( 25 Dec 2021 04:33 )  PTT:28.6 sec                                       Urinalysis Basic - ( 24 Dec 2021 23:03 )    Color: Yellow / Appearance: Slightly Turbid / S.015 / pH: x  Gluc: x / Ketone: Small  / Bili: Negative / Urobili: <2 mg/dL   Blood: x / Protein: 30 mg/dL / Nitrite: Positive   Leuk Esterase: Large / RBC: 11 /HPF / WBC 57 /HPF   Sq Epi: x / Non Sq Epi: 2 /HPF / Bacteria: Many                                                  LIVER FUNCTIONS - ( 26 Dec 2021 04:46 )  Alb: 3.3 g/dL / Pro: 5.1 g/dL / ALK PHOS: 37 U/L / ALT: 16 U/L / AST: 18 U/L / GGT: x                                                                                                                                               MEDICATIONS  (STANDING):  benzocaine 20% Spray 1 Spray(s) Topical three times a day  bictegravir 50 mG/emtricitabine 200 mG/tenofovir alafenamide 25 mG (BIKTARVY) 1 Tablet(s) Oral daily  brimonidine 0.2% Ophthalmic Solution 1 Drop(s) Both EYES three times a day  cefTRIAXone   IVPB 1000 milliGRAM(s) IV Intermittent every 24 hours  chlorhexidine 4% Liquid 1 Application(s) Topical <User Schedule>  citalopram 20 milliGRAM(s) Oral daily  lactated ringers. 1000 milliLiter(s) (75 mL/Hr) IV Continuous <Continuous>  montelukast 10 milliGRAM(s) Oral daily  pantoprazole Infusion 8 mG/Hr (10 mL/Hr) IV Continuous <Continuous>    MEDICATIONS  (PRN):  ondansetron Injectable 4 milliGRAM(s) IV Push two times a day PRN Nausea      Xrays:                                                                                     ECHO

## 2021-12-26 NOTE — PROGRESS NOTE ADULT - SUBJECTIVE AND OBJECTIVE BOX
SUBJECTIVE:    Patient is a 76y old Female who presents with a chief complaint of GI bleed (26 Dec 2021 09:12)    Overnight Events: No overnight events. Patient reports no nausea/ vomiting, abdominal pain, hematemesis, melena or rectal bleeding.    PAST MEDICAL & SURGICAL HISTORY  HIV (human immunodeficiency virus infection)    Pacemaker    Breast cancer    DVT, lower extremity    Pulmonary embolism    GI bleed    Depression      SOCIAL HISTORY:  Negative for smoking/alcohol/drug use.     ALLERGIES:  No Known Allergies    MEDICATIONS:  STANDING MEDICATIONS  aspirin  chewable 81 milliGRAM(s) Oral daily  benzocaine 20% Spray 1 Spray(s) Topical three times a day  bictegravir 50 mG/emtricitabine 200 mG/tenofovir alafenamide 25 mG (BIKTARVY) 1 Tablet(s) Oral daily  brimonidine 0.2% Ophthalmic Solution 1 Drop(s) Both EYES three times a day  cefTRIAXone   IVPB 1000 milliGRAM(s) IV Intermittent every 24 hours  chlorhexidine 4% Liquid 1 Application(s) Topical <User Schedule>  citalopram 20 milliGRAM(s) Oral daily  enoxaparin Injectable 40 milliGRAM(s) SubCutaneous daily  montelukast 10 milliGRAM(s) Oral daily  pantoprazole  Injectable 40 milliGRAM(s) IV Push two times a day    PRN MEDICATIONS  ondansetron Injectable 4 milliGRAM(s) IV Push two times a day PRN    VITALS:   T(F): 98.5, Max: 98.5 (- @ 08:00)  HR: 82 (74 - 88)  BP: 135/65 (118/62 - 158/72)  RR: 20 (13 - 41)  SpO2: 94% (93% - 100%)    LABS:                        10.3   6.20  )-----------( 100      ( 26 Dec 2021 04:46 )             30.7         140  |  108  |  18  ----------------------------<  108<H>  3.8   |  19  |  0.6<L>    Ca    8.8      26 Dec 2021 04:46  Mg     1.9         TPro  5.1<L>  /  Alb  3.3<L>  /  TBili  0.7  /  DBili  x   /  AST  18  /  ALT  16  /  AlkPhos  37      PT/INR - ( 25 Dec 2021 04:33 )   PT: 12.50 sec;   INR: 1.09 ratio         PTT - ( 25 Dec 2021 04:33 )  PTT:28.6 sec  Urinalysis Basic - ( 24 Dec 2021 23:03 )    Color: Yellow / Appearance: Slightly Turbid / S.015 / pH: x  Gluc: x / Ketone: Small  / Bili: Negative / Urobili: <2 mg/dL   Blood: x / Protein: 30 mg/dL / Nitrite: Positive   Leuk Esterase: Large / RBC: 11 /HPF / WBC 57 /HPF   Sq Epi: x / Non Sq Epi: 2 /HPF / Bacteria: Many                    21 @ 07:01  -  21 @ 07:00  --------------------------------------------------------  IN: 1735 mL / OUT: 2150 mL / NET: -415 mL    21 @ 07:01  -  21 @ 21:11  --------------------------------------------------------  IN: 730 mL / OUT: 5 mL / NET: 725 mL          IMAGING/EKG: No recent imaging    PHYSICAL EXAM:  GEN: NAD, comfortable  LUNGS: CTAB, no w/r/r  HEART: RRR, s1 and s2 appreciated, no m/r/g  ABD: soft, NT/ND, +BS  EXT: no edema, PP b/l  NEURO: AAOX3

## 2021-12-26 NOTE — PROGRESS NOTE ADULT - ASSESSMENT
IMPRESSIONS:    UGIB due to Matilda Jiménez tear - s/p EGD and clipping  HO HIV  HO DVT/PE on ASA  HO Breast cancer    PLAN:    CNS: avoid sedation    ENT: oral care, HOB 45 deg,    CARDIOVASCULAR: Adequate Fluid resuscitation (SBP > 90), c/w home medications    PULMONARY: aspirations precautions     GASTROINTESTINAL: Advance diet to clears. PPI gtt. CBC at 4 PM    RENAL: monitor renal output, electrolytes     INFECTIOUS DISEASE:  c/w ceftriaxone 1 gr Q24H, f/u urine cx.     ENDOCRINE: FS per protocol. insulin per ICU protocol.    HEMATOLOGY: Maintain active type and screen. Trend Hb q8hrs and Keep it > 8, transfuse PRBC if necessary.      CODE STATUS: Full code  DVT PPX: SCD  DISPO: Possible downgrade to SDU IMPRESSIONS:    UGIB due to Matilda Jiménez tear - s/p EGD and clipping  SMA dissection  HO HIV  HO DVT/PE on ASA  HO Breast cancer    PLAN:    CNS: avoid sedation    ENT: oral care, HOB 45 deg,    CARDIOVASCULAR: Adequate Fluid resuscitation (SBP > 90), c/w home medications. F/u Vascular surgery.    PULMONARY: aspirations precautions     GASTROINTESTINAL: Advance diet to clears. PPI gtt. CBC at 4 PM    RENAL: monitor renal output, electrolytes     INFECTIOUS DISEASE:  c/w ceftriaxone 1 gr Q24H, f/u urine cx.     ENDOCRINE: FS per protocol. insulin per ICU protocol.    HEMATOLOGY: Maintain active type and screen. Trend Hb q8hrs and Keep it > 8, transfuse PRBC if necessary.      CODE STATUS: Full code  DVT PPX: SCD  DISPO: Possible downgrade to SDU

## 2021-12-26 NOTE — CHART NOTE - NSCHARTNOTEFT_GEN_A_CORE
Transfer Note    Transfer from: icu  Transfer to: med floor         HOSPITAL COURSE:  H&P:  Patient is 76 years old Kiswahili speaking female with PMH of HIV( on HAART therapy, unknown CD4 count and viral load), breast cancer  s/p right lumpectomy, PE/DVT (3 years ago, on ASA, not currently on anticoagulation), PUD( 15 years ago), depression presents after several episodes vomiting after eating dinner with blood noted in emesis. Pt had dinner last night followed by multiple episodes of NBNB vomiting followed by 3-4 episodes of hematemesis more than cupful associated with epigastric discomfort. Pr reports taking Alleve daily.  Patient reported feeling dizzy, admits to watery nonbloody diarrhea. Had an EGD and colonoscopy 7 years ago at [place].   Denies fevers, chills, cough, CP, SOB, palpitations. Taking all meds as prescribed, however does not remember the names of her medications.     In ED, VS: /82  HR 84, RR 18 Spo2 99 on RA.  On Labs Hgb is stable 12.4 ( unknown baseline). .6. UA positive for leukocyte esterase and nitrite.  CELSO in ED showed brown stool; No gross blood or melena per rectum.  CT angio of abdomen showed active arterial extravasation within the lesser curvature of the stomach, just below the gastroesophageal junction; and Asymmetric wall thickening along the lesser curvature with suggestion of focal outpouching in the region of active arterial extravasation, reflecting active GI bleed secondary to ulcer disease.  Pt was started on protonix gtt, received Erythromycin 250mg IV once. Also was started on Ceftriaxone for UTI.   Pt was seen by GI, recommended upgrade to ICU with plan for EGD in AM.    Pt admitted to ICU. Pt underwent EGD 12/25 which showed yunior jiménez tear s/p clipping, no longer bleeding, HGB and hemodynamically stable, pt stable for downgrade to floor.     ASSESSMENT & PLAN:     75 yo F patient with a PMH of hypothyroidism and HIV on HAART, presents to the ED with hematemesis.    # Hematemesis    Appeared after several episodes of nausea/ vomiting likely due to gastroenteritis  Due to Yunior-Jiménez tear s/p 3 clips applied  Patient reports no recurrence; no bowel movements  Hb stable  On PPI drip; diet advanced to clear liquid diet  Monitor BM, H&H, active T/S    # HIV    Continue HIV medications'  will obtain HIV RNA levels, and CD4 count    # Hypothyroidism    Continue levothyroxine    PPI drip  Lovenox prophylactic dose starting today  AAT  Clear liquid diet  Full code  downgrade to floor        For Follow-Up:  -GI recs  -vascualr surgery recs         Vital Signs Last 24 Hrs  T(C): 36.9 (26 Dec 2021 08:00), Max: 36.9 (26 Dec 2021 08:00)  T(F): 98.5 (26 Dec 2021 08:00), Max: 98.5 (26 Dec 2021 08:00)  HR: 82 (26 Dec 2021 19:00) (74 - 88)  BP: 135/65 (26 Dec 2021 18:00) (118/62 - 158/72)  BP(mean): 89 (26 Dec 2021 14:00) (70 - 106)  RR: 20 (26 Dec 2021 19:00) (13 - 41)  SpO2: 94% (26 Dec 2021 19:00) (93% - 100%)  I&O's Summary    25 Dec 2021 07:01  -  26 Dec 2021 07:00  --------------------------------------------------------  IN: 1735 mL / OUT: 2150 mL / NET: -415 mL    26 Dec 2021 07:01  -  26 Dec 2021 21:15  --------------------------------------------------------  IN: 730 mL / OUT: 5 mL / NET: 725 mL          MEDICATIONS  (STANDING):  aspirin  chewable 81 milliGRAM(s) Oral daily  benzocaine 20% Spray 1 Spray(s) Topical three times a day  bictegravir 50 mG/emtricitabine 200 mG/tenofovir alafenamide 25 mG (BIKTARVY) 1 Tablet(s) Oral daily  brimonidine 0.2% Ophthalmic Solution 1 Drop(s) Both EYES three times a day  cefTRIAXone   IVPB 1000 milliGRAM(s) IV Intermittent every 24 hours  chlorhexidine 4% Liquid 1 Application(s) Topical <User Schedule>  citalopram 20 milliGRAM(s) Oral daily  enoxaparin Injectable 40 milliGRAM(s) SubCutaneous daily  montelukast 10 milliGRAM(s) Oral daily  pantoprazole  Injectable 40 milliGRAM(s) IV Push two times a day    MEDICATIONS  (PRN):  ondansetron Injectable 4 milliGRAM(s) IV Push two times a day PRN Nausea        LABS                                            10.3                  Neurophils% (auto):   67.4   (12-26 @ 04:46):    6.20 )-----------(100          Lymphocytes% (auto):  20.6                                          30.7                   Eosinphils% (auto):   1.6      Manual%: Neutrophils x    ; Lymphocytes x    ; Eosinophils x    ; Bands%: x    ; Blasts x                                    140    |  108    |  18                  Calcium: 8.8   / iCa: x      (12-26 @ 04:46)    ----------------------------<  108       Magnesium: 1.9                              3.8     |  19     |  0.6              Phosphorous: x        TPro  5.1    /  Alb  3.3    /  TBili  0.7    /  DBili  x      /  AST  18     /  ALT  16     /  AlkPhos  37     26 Dec 2021 04:46

## 2021-12-26 NOTE — PROGRESS NOTE ADULT - SUBJECTIVE AND OBJECTIVE BOX
Gastroenterology progress note:     Patient is a 76y old  Female who presents with a chief complaint of GI bleed (25 Dec 2021 15:36)       Admitted on: 12-25-21    We are following the patient for hematemesis s/p EGD     Interval History:  No BM last night denies any nausea, vomiting.        PAST MEDICAL & SURGICAL HISTORY:  HIV (human immunodeficiency virus infection)    Pacemaker    Breast cancer    DVT, lower extremity    Pulmonary embolism    GI bleed    Depression        MEDICATIONS  (STANDING):  benzocaine 20% Spray 1 Spray(s) Topical three times a day  bictegravir 50 mG/emtricitabine 200 mG/tenofovir alafenamide 25 mG (BIKTARVY) 1 Tablet(s) Oral daily  brimonidine 0.2% Ophthalmic Solution 1 Drop(s) Both EYES three times a day  cefTRIAXone   IVPB 1000 milliGRAM(s) IV Intermittent every 24 hours  chlorhexidine 4% Liquid 1 Application(s) Topical <User Schedule>  citalopram 20 milliGRAM(s) Oral daily  lactated ringers. 1000 milliLiter(s) (75 mL/Hr) IV Continuous <Continuous>  montelukast 10 milliGRAM(s) Oral daily  pantoprazole Infusion 8 mG/Hr (10 mL/Hr) IV Continuous <Continuous>    MEDICATIONS  (PRN):  ondansetron Injectable 4 milliGRAM(s) IV Push two times a day PRN Nausea      Allergies  No Known Allergies      Review of Systems:   Cardiovascular:  No Chest Pain, No Palpitations  Respiratory:  No Cough, No Dyspnea  Gastrointestinal:  As described in HPI    Physical Examination:  T(C): 36.9 (12-26-21 @ 08:00), Max: 36.9 (12-26-21 @ 08:00)  HR: 80 (12-26-21 @ 08:00) (76 - 113)  BP: 148/67 (12-26-21 @ 08:00) (88/54 - 158/72)  RR: 15 (12-26-21 @ 08:00) (13 - 40)  SpO2: 97% (12-26-21 @ 08:00) (91% - 100%)  Weight (kg): 69.4 (12-25-21 @ 08:57)    12-25-21 @ 07:01  -  12-26-21 @ 07:00  --------------------------------------------------------  IN: 1735 mL / OUT: 2150 mL / NET: -415 mL      Constitutional: No acute distress.  Respiratory:  No signs of respiratory distress. Lung sounds are clear bilaterally.  Cardiovascular:  S1 S2, Regular rate and rhythm.  Abdominal: Abdomen is soft, symmetric, and non-tender without distention. There are no visible lesions or scars. Bowel sounds are present and normoactive in all four quadrants. No masses, hepatomegaly, or splenomegaly are noted.   Skin: No rashes, No Jaundice.        Data:                        10.3   6.20  )-----------( 100      ( 26 Dec 2021 04:46 )             30.7     Hgb trend:  10.3  12-26-21 @ 04:46  10.5  12-25-21 @ 23:30  10.1  12-25-21 @ 22:00  10.9  12-25-21 @ 16:47  11.2  12-25-21 @ 12:09  10.3  12-25-21 @ 04:30  12.4  12-24-21 @ 23:25        12-26    140  |  108  |  18  ----------------------------<  108<H>  3.8   |  19  |  0.6<L>    Ca    8.8      26 Dec 2021 04:46  Mg     1.9     12-26    TPro  5.1<L>  /  Alb  3.3<L>  /  TBili  0.7  /  DBili  x   /  AST  18  /  ALT  16  /  AlkPhos  37  12-26    Liver panel trend:  TBili 0.7   /   AST 18   /   ALT 16   /   AlkP 37   /   Tptn 5.1   /   Alb 3.3    /   DBili --      12-26  TBili 1.0   /   AST 21   /   ALT 17   /   AlkP 37   /   Tptn 5.3   /   Alb 3.4    /   DBili --      12-25  TBili 0.5   /   AST 17   /   ALT 19   /   AlkP 37   /   Tptn 5.6   /   Alb 3.6    /   DBili --      12-25  TBili 0.5   /   AST 23   /   ALT 24   /   AlkP 45   /   Tptn 6.6   /   Alb 4.2    /   DBili --      12-24      PT/INR - ( 25 Dec 2021 04:33 )   PT: 12.50 sec;   INR: 1.09 ratio         PTT - ( 25 Dec 2021 04:33 )  PTT:28.6 sec       Radiology:

## 2021-12-26 NOTE — PROGRESS NOTE ADULT - ASSESSMENT
77 yo F patient with a PMH of hypothyroidism and HIV on HAART, presents to the ED with hematemesis.    # Hematemesis    Appeared after several episodes of nausea/ vomiting likely due to gastroenteritis  Due to Matilda-Jiménez tear s/p 3 clips applied  Patient reports no recurrence; no bowel movements  Hb stable  On PPI drip; diet advanced to clear liquid diet  Monitor BM, H&H, active T/S    # HIV    Continue HIV medications'  will obtain HIV RNA levels, and CD4 count    # Hypothyroidism    Continue levothyroxine    PPI drip  Lovenox prophylactic dose starting today  AAT  Clear liquid diet  Full code  Might downgrade to floor

## 2021-12-26 NOTE — PROGRESS NOTE ADULT - ASSESSMENT
76 yold female to ED Pmhx Hiv +(?cdv/viral load) on [facility] tx, ppm, dvt/pe 3 yrs ago currently only on asa, hx gi bleed, Breast cancer s/p right lumpectomy, depression; pt Mongolian speaking family at bed side intrep on pt request - c/o dizzness/lightheaded with n/v/diarrhea started tonight after dinner; family states pt vomitted blood; brown stool no gross blood or melena  Had dinner last night followed by multiple episodes of NBNB vomiting followed by 3-4 episodes of hematemesis more than cupful associated with epigastric discomfort. h/o alleve intake every day. Had an EGD and colonospy 7 years ago at [place]. h/o PUD 15 years ago.     #)Upper GI Bleed/Hematemesis sec to yunior ricky tear -Resolved  s/p EGD 12/25 yunior ricky tear s/p clipping  Hemodynamically stable   Hb stable  No BM last night     Rec:  clear liquid diet   can resume aspirin   Maintain active type and screen.  2 18 guage  Trend CBC once a day keep Hb>8  c/w IV PPI drip 76 yold female to ED Pmhx Hiv +(?cdv/viral load) on [facility] tx, ppm, dvt/pe 3 yrs ago currently only on asa, hx gi bleed, Breast cancer s/p right lumpectomy, depression; pt French speaking family at bed side intrep on pt request - c/o dizzness/lightheaded with n/v/diarrhea started tonight after dinner; family states pt vomitted blood; brown stool no gross blood or melena  Had dinner last night followed by multiple episodes of NBNB vomiting followed by 3-4 episodes of hematemesis more than cupful associated with epigastric discomfort. h/o alleve intake every day. Had an EGD and colonospy 7 years ago at [place]. h/o PUD 15 years ago.     #)Upper GI Bleed/Hematemesis sec to yunior ricky tear -Resolved  s/p EGD 12/25 yunior ricky tear s/p clipping  Hemodynamically stable   Hb stable  No BM last night     Rec:  clear liquid diet if tolerating advance to soft diet  can resume aspirin   Maintain active type and screen.  2 18 guage  Trend CBC once a day keep Hb>8  c/w IV PPI drip today then change to PO PPI BID tomorrow  If Hb stable and tolerating diet can be discharged from GI stand point   recall as needed

## 2021-12-27 VITALS
TEMPERATURE: 97 F | DIASTOLIC BLOOD PRESSURE: 95 MMHG | HEART RATE: 93 BPM | SYSTOLIC BLOOD PRESSURE: 145 MMHG | OXYGEN SATURATION: 96 % | RESPIRATION RATE: 18 BRPM

## 2021-12-27 LAB
4/8 RATIO: 1.5 RATIO — SIGNIFICANT CHANGE UP (ref 0.86–4.14)
ABS CD8: 467 /UL — SIGNIFICANT CHANGE UP (ref 90–775)
ALBUMIN SERPL ELPH-MCNC: 3.4 G/DL — LOW (ref 3.5–5.2)
ALP SERPL-CCNC: 36 U/L — SIGNIFICANT CHANGE UP (ref 30–115)
ALT FLD-CCNC: 16 U/L — SIGNIFICANT CHANGE UP (ref 0–41)
ANION GAP SERPL CALC-SCNC: 12 MMOL/L — SIGNIFICANT CHANGE UP (ref 7–14)
AST SERPL-CCNC: 19 U/L — SIGNIFICANT CHANGE UP (ref 0–41)
BASOPHILS # BLD AUTO: 0.02 K/UL — SIGNIFICANT CHANGE UP (ref 0–0.2)
BASOPHILS NFR BLD AUTO: 0.5 % — SIGNIFICANT CHANGE UP (ref 0–1)
BILIRUB SERPL-MCNC: 0.5 MG/DL — SIGNIFICANT CHANGE UP (ref 0.2–1.2)
BUN SERPL-MCNC: 8 MG/DL — LOW (ref 10–20)
CALCIUM SERPL-MCNC: 9.3 MG/DL — SIGNIFICANT CHANGE UP (ref 8.5–10.1)
CD16+CD56+ CELLS NFR BLD: 7 % — SIGNIFICANT CHANGE UP (ref 7–27)
CD16+CD56+ CELLS NFR SPEC: 102 /UL — SIGNIFICANT CHANGE UP (ref 80–426)
CD19 BLASTS SPEC-ACNC: 116 /UL — SIGNIFICANT CHANGE UP (ref 32–326)
CD19 BLASTS SPEC-ACNC: 8 % — SIGNIFICANT CHANGE UP (ref 4–18)
CD3 BLASTS SPEC-ACNC: 1220 /UL — SIGNIFICANT CHANGE UP (ref 396–2024)
CD3 BLASTS SPEC-ACNC: 84 % — SIGNIFICANT CHANGE UP (ref 58–84)
CD4 %: 48 % — SIGNIFICANT CHANGE UP (ref 30–56)
CD8 %: 32 % — SIGNIFICANT CHANGE UP (ref 11–43)
CHLORIDE SERPL-SCNC: 109 MMOL/L — SIGNIFICANT CHANGE UP (ref 98–110)
CO2 SERPL-SCNC: 23 MMOL/L — SIGNIFICANT CHANGE UP (ref 17–32)
CREAT SERPL-MCNC: 0.7 MG/DL — SIGNIFICANT CHANGE UP (ref 0.7–1.5)
EOSINOPHIL # BLD AUTO: 0.22 K/UL — SIGNIFICANT CHANGE UP (ref 0–0.7)
EOSINOPHIL NFR BLD AUTO: 6 % — SIGNIFICANT CHANGE UP (ref 0–8)
FOLATE SERPL-MCNC: 8.4 NG/ML — SIGNIFICANT CHANGE UP
GLUCOSE SERPL-MCNC: 103 MG/DL — HIGH (ref 70–99)
HCT VFR BLD CALC: 29.5 % — LOW (ref 37–47)
HGB BLD-MCNC: 9.9 G/DL — LOW (ref 12–16)
IMM GRANULOCYTES NFR BLD AUTO: 0.3 % — SIGNIFICANT CHANGE UP (ref 0.1–0.3)
LYMPHOCYTES # BLD AUTO: 1.16 K/UL — LOW (ref 1.2–3.4)
LYMPHOCYTES # BLD AUTO: 31.9 % — SIGNIFICANT CHANGE UP (ref 20.5–51.1)
MAGNESIUM SERPL-MCNC: 2.1 MG/DL — SIGNIFICANT CHANGE UP (ref 1.8–2.4)
MCHC RBC-ENTMCNC: 33.6 G/DL — SIGNIFICANT CHANGE UP (ref 32–37)
MCHC RBC-ENTMCNC: 34.1 PG — HIGH (ref 27–31)
MCV RBC AUTO: 101.7 FL — HIGH (ref 81–99)
MONOCYTES # BLD AUTO: 0.47 K/UL — SIGNIFICANT CHANGE UP (ref 0.1–0.6)
MONOCYTES NFR BLD AUTO: 12.9 % — HIGH (ref 1.7–9.3)
NEUTROPHILS # BLD AUTO: 1.76 K/UL — SIGNIFICANT CHANGE UP (ref 1.4–6.5)
NEUTROPHILS NFR BLD AUTO: 48.4 % — SIGNIFICANT CHANGE UP (ref 42.2–75.2)
NRBC # BLD: 0 /100 WBCS — SIGNIFICANT CHANGE UP (ref 0–0)
PLATELET # BLD AUTO: 101 K/UL — LOW (ref 130–400)
POTASSIUM SERPL-MCNC: 3.8 MMOL/L — SIGNIFICANT CHANGE UP (ref 3.5–5)
POTASSIUM SERPL-SCNC: 3.8 MMOL/L — SIGNIFICANT CHANGE UP (ref 3.5–5)
PROT SERPL-MCNC: 5.4 G/DL — LOW (ref 6–8)
RBC # BLD: 2.9 M/UL — LOW (ref 4.2–5.4)
RBC # FLD: 13.9 % — SIGNIFICANT CHANGE UP (ref 11.5–14.5)
SODIUM SERPL-SCNC: 144 MMOL/L — SIGNIFICANT CHANGE UP (ref 135–146)
T-CELL CD4 SUBSET PNL BLD: 699 /UL — SIGNIFICANT CHANGE UP (ref 325–1251)
TSH SERPL-MCNC: 0.62 UIU/ML — SIGNIFICANT CHANGE UP (ref 0.27–4.2)
VIT B12 SERPL-MCNC: 292 PG/ML — SIGNIFICANT CHANGE UP (ref 232–1245)
WBC # BLD: 3.64 K/UL — LOW (ref 4.8–10.8)
WBC # FLD AUTO: 3.64 K/UL — LOW (ref 4.8–10.8)

## 2021-12-27 PROCEDURE — 99238 HOSP IP/OBS DSCHRG MGMT 30/<: CPT

## 2021-12-27 PROCEDURE — 71045 X-RAY EXAM CHEST 1 VIEW: CPT | Mod: 26

## 2021-12-27 RX ORDER — CEFPODOXIME PROXETIL 100 MG
1 TABLET ORAL
Qty: 10 | Refills: 0
Start: 2021-12-27 | End: 2021-12-31

## 2021-12-27 RX ORDER — PANTOPRAZOLE SODIUM 20 MG/1
40 TABLET, DELAYED RELEASE ORAL
Refills: 0 | Status: DISCONTINUED | OUTPATIENT
Start: 2021-12-27 | End: 2021-12-27

## 2021-12-27 RX ORDER — PANTOPRAZOLE SODIUM 20 MG/1
1 TABLET, DELAYED RELEASE ORAL
Qty: 60 | Refills: 0
Start: 2021-12-27 | End: 2022-01-25

## 2021-12-27 RX ORDER — LOSARTAN POTASSIUM 100 MG/1
1 TABLET, FILM COATED ORAL
Qty: 0 | Refills: 0 | DISCHARGE

## 2021-12-27 RX ADMIN — MONTELUKAST 10 MILLIGRAM(S): 4 TABLET, CHEWABLE ORAL at 13:48

## 2021-12-27 RX ADMIN — PANTOPRAZOLE SODIUM 40 MILLIGRAM(S): 20 TABLET, DELAYED RELEASE ORAL at 06:14

## 2021-12-27 RX ADMIN — CITALOPRAM 20 MILLIGRAM(S): 10 TABLET, FILM COATED ORAL at 13:47

## 2021-12-27 RX ADMIN — ENOXAPARIN SODIUM 40 MILLIGRAM(S): 100 INJECTION SUBCUTANEOUS at 13:47

## 2021-12-27 RX ADMIN — BICTEGRAVIR SODIUM, EMTRICITABINE, AND TENOFOVIR ALAFENAMIDE FUMARATE 1 TABLET(S): 30; 120; 15 TABLET ORAL at 13:46

## 2021-12-27 RX ADMIN — BRIMONIDINE TARTRATE 1 DROP(S): 2 SOLUTION/ DROPS OPHTHALMIC at 13:47

## 2021-12-27 RX ADMIN — BRIMONIDINE TARTRATE 1 DROP(S): 2 SOLUTION/ DROPS OPHTHALMIC at 06:14

## 2021-12-27 RX ADMIN — Medication 1 SPRAY(S): at 06:13

## 2021-12-27 RX ADMIN — Medication 81 MILLIGRAM(S): at 13:48

## 2021-12-27 NOTE — DISCHARGE NOTE PROVIDER - CARE PROVIDER_API CALL
Graeme Tee (DO)  Medicine  [address] [facility], 1st [place] [address]  Phone: [phone]  Fax: [phone]  Follow Up Time: 2 weeks    Emre Pringle (MD)  Infectious Disease; Internal Medicine  [address] [place] [address]  Phone: [phone]  Fax: [phone]  Follow Up Time: 2 weeks    Kalyan Segura)  Gastroenterology; Internal Medicine  [address] [place] [address]  Phone: [phone]  Fax: [phone]  Follow Up Time: 1 week

## 2021-12-27 NOTE — PROGRESS NOTE ADULT - SUBJECTIVE AND OBJECTIVE BOX
FABIO KAUR 76y Female  MRN#: [id number]   CODE STATUS: Full  Hospital Day: 2d  Pt is currently admitted with the primary diagnosis of: Matilda-Jiménez tear    SUBJECTIVE  Overnight events: None reported.  Subjective complaints: Denies abdominal pain, states tolerating diet well.                                          ----------------------------------------------------------  OBJECTIVE  PAST MEDICAL & SURGICAL HISTORY  HIV (human immunodeficiency virus infection)    Pacemaker    Breast cancer    DVT, lower extremity    Pulmonary embolism    GI bleed    Depression                                          -----------------------------------------------------------  ALLERGIES:  No Known Allergies                                          ------------------------------------------------------------  HOME MEDICATIONS  Home Medications:  aspirin 81 mg oral delayed release tablet: 1 tab(s) orally once a day (25 Dec 2021 05:45)  Biktarvy oral tablet: 1 tab(s) orally once a day (25 Dec 2021 04:38)  brimonidine 0.2% ophthalmic solution: 1 drop(s) to each affected eye 3 times a day (25 Dec 2021 04:42)  citalopram 20 mg oral tablet: 1 tab(s) orally once a day (25 Dec 2021 04:38)  latanoprost 0.005% ophthalmic solution: 1 drop(s) to each affected eye once a day (in the evening) (25 Dec 2021 04:43)  losartan 100 mg oral tablet: 1 tab(s) orally once a day (25 Dec 2021 04:37)  montelukast 10 mg oral tablet: 1 tab(s) orally once a day (25 Dec 2021 04:42)  rosuvastatin 20 mg oral tablet: 1 tab(s) orally once a day (25 Dec 2021 04:37)                         MEDICATIONS:  STANDING MEDICATIONS  aspirin  chewable 81 milliGRAM(s) Oral daily  bictegravir 50 mG/emtricitabine 200 mG/tenofovir alafenamide 25 mG (BIKTARVY) 1 Tablet(s) Oral daily  brimonidine 0.2% Ophthalmic Solution 1 Drop(s) Both EYES three times a day  cefTRIAXone   IVPB 1000 milliGRAM(s) IV Intermittent every 24 hours  chlorhexidine 4% Liquid 1 Application(s) Topical <User Schedule>  citalopram 20 milliGRAM(s) Oral daily  enoxaparin Injectable 40 milliGRAM(s) SubCutaneous daily  montelukast 10 milliGRAM(s) Oral daily  pantoprazole   Suspension 40 milliGRAM(s) Oral two times a day    PRN MEDICATIONS  ondansetron Injectable 4 milliGRAM(s) IV Push two times a day PRN                                          ------------------------------------------------------------  VITAL SIGNS: Last 24 Hours  T(C): 36.2 (27 Dec 2021 07:52), Max: 36.2 (27 Dec 2021 07:52)  T(F): 97.2 (27 Dec 2021 07:52), Max: 97.2 (27 Dec 2021 07:52)  HR: 93 (27 Dec 2021 07:52) (76 - 93)  BP: 145/95 (27 Dec 2021 07:52) (118/75 - 145/95)  BP(mean): 89 (26 Dec 2021 14:00) (76 - 89)  RR: 18 (27 Dec 2021 07:52) (13 - 41)  SpO2: 96% (27 Dec 2021 07:52) (93% - 99%)    12-26-21 @ 07:01  -  12-27-21 @ 07:00  --------------------------------------------------------  IN: 730 mL / OUT: 5 mL / NET: 725 mL                                         --------------------------------------------------------------  LABS:             9.9    3.64  )-----------( 101      ( 27 Dec 2021 04:30 )             29.5     12-27    144  |  109  |  8<L>  ----------------------------<  103<H>  3.8   |  23  |  0.7    Ca    9.3      27 Dec 2021 04:30  Mg     2.1     12-27    TPro  5.4<L>  /  Alb  3.4<L>  /  TBili  0.5  /  DBili  x   /  AST  19  /  ALT  16  /  AlkPhos  36  12-27                                          -------------------------------------------------------------  RADIOLOGY:  < from: Xray Chest 1 View- PORTABLE-Routine (Xray Chest 1 View- PORTABLE-Routine in AM.) (12.27.21 @ 05:52) >  FINDINGS/  IMPRESSION:    Left chest wall single chamber AICD is stable. No focal consolidation,   pneumothorax or pleural effusion.    Stable cardiomediastinal silhouette. Unchanged osseous structures.    < end of copied text >                                          --------------------------------------------------------------  PHYSICAL EXAM:  General: alert, awake, no acute distress  HEENT: atraumatic  LUNGS: clear to auscultation bilaterally, no wheezes noted  HEART: regular rate/rhythm, soft 1-2/6 diastolic murmur 3rd-4th ICS left sternal border  ABDOMEN: soft, nontender, nondistended, normoactive bowel sounds  EXT: 2+ radial pulses, no edema noted  NEURO: aaox4, proper affect  SKIN: intact, no new lesions noted                                         --------------------------------------------------------------  ASSESSMENT & PLAN  Past medical history and hospital course:                                                                              ----------------------------------------------------  # DVT prophylaxis:  # GI prophylaxis:  # Diet:  # Activity:  # Code status:  # Disposition:                                                                           --------------------------------------------------------  # Handoff: FABIO KAUR 76y Female  MRN#: [id number]   CODE STATUS: Full  Hospital Day: 2d  Pt is currently admitted with the primary diagnosis of: Matilda-Jiménez tear    SUBJECTIVE  Overnight events: None reported.  Subjective complaints: Denies abdominal pain, states tolerating diet well.                                          ----------------------------------------------------------  OBJECTIVE  PAST MEDICAL & SURGICAL HISTORY  HIV (human immunodeficiency virus infection)    Pacemaker    Breast cancer    DVT, lower extremity    Pulmonary embolism    GI bleed    Depression                                          -----------------------------------------------------------  ALLERGIES:  No Known Allergies                                          ------------------------------------------------------------  HOME MEDICATIONS  Home Medications:  aspirin 81 mg oral delayed release tablet: 1 tab(s) orally once a day (25 Dec 2021 05:45)  Biktarvy oral tablet: 1 tab(s) orally once a day (25 Dec 2021 04:38)  brimonidine 0.2% ophthalmic solution: 1 drop(s) to each affected eye 3 times a day (25 Dec 2021 04:42)  citalopram 20 mg oral tablet: 1 tab(s) orally once a day (25 Dec 2021 04:38)  latanoprost 0.005% ophthalmic solution: 1 drop(s) to each affected eye once a day (in the evening) (25 Dec 2021 04:43)  losartan 100 mg oral tablet: 1 tab(s) orally once a day (25 Dec 2021 04:37)  montelukast 10 mg oral tablet: 1 tab(s) orally once a day (25 Dec 2021 04:42)  rosuvastatin 20 mg oral tablet: 1 tab(s) orally once a day (25 Dec 2021 04:37)                         MEDICATIONS:  STANDING MEDICATIONS  aspirin  chewable 81 milliGRAM(s) Oral daily  bictegravir 50 mG/emtricitabine 200 mG/tenofovir alafenamide 25 mG (BIKTARVY) 1 Tablet(s) Oral daily  brimonidine 0.2% Ophthalmic Solution 1 Drop(s) Both EYES three times a day  cefTRIAXone   IVPB 1000 milliGRAM(s) IV Intermittent every 24 hours  chlorhexidine 4% Liquid 1 Application(s) Topical <User Schedule>  citalopram 20 milliGRAM(s) Oral daily  enoxaparin Injectable 40 milliGRAM(s) SubCutaneous daily  montelukast 10 milliGRAM(s) Oral daily  pantoprazole   Suspension 40 milliGRAM(s) Oral two times a day    PRN MEDICATIONS  ondansetron Injectable 4 milliGRAM(s) IV Push two times a day PRN                                          ------------------------------------------------------------  VITAL SIGNS: Last 24 Hours  T(C): 36.2 (27 Dec 2021 07:52), Max: 36.2 (27 Dec 2021 07:52)  T(F): 97.2 (27 Dec 2021 07:52), Max: 97.2 (27 Dec 2021 07:52)  HR: 93 (27 Dec 2021 07:52) (76 - 93)  BP: 145/95 (27 Dec 2021 07:52) (118/75 - 145/95)  BP(mean): 89 (26 Dec 2021 14:00) (76 - 89)  RR: 18 (27 Dec 2021 07:52) (13 - 41)  SpO2: 96% (27 Dec 2021 07:52) (93% - 99%)    12-26-21 @ 07:01  -  12-27-21 @ 07:00  --------------------------------------------------------  IN: 730 mL / OUT: 5 mL / NET: 725 mL                                         --------------------------------------------------------------  LABS:             9.9    3.64  )-----------( 101      ( 27 Dec 2021 04:30 )             29.5     12-27    144  |  109  |  8<L>  ----------------------------<  103<H>  3.8   |  23  |  0.7    Ca    9.3      27 Dec 2021 04:30  Mg     2.1     12-27    TPro  5.4<L>  /  Alb  3.4<L>  /  TBili  0.5  /  DBili  x   /  AST  19  /  ALT  16  /  AlkPhos  36  12-27                                          -------------------------------------------------------------  RADIOLOGY:  < from: Xray Chest 1 View- PORTABLE-Routine (Xray Chest 1 View- PORTABLE-Routine in AM.) (12.27.21 @ 05:52) >  FINDINGS/  IMPRESSION:    Left chest wall single chamber AICD is stable. No focal consolidation,   pneumothorax or pleural effusion.    Stable cardiomediastinal silhouette. Unchanged osseous structures.    < end of copied text >                                          --------------------------------------------------------------  PHYSICAL EXAM:  General: alert, awake, no acute distress  HEENT: atraumatic  LUNGS: clear to auscultation bilaterally, no wheezes noted  HEART: regular rate/rhythm, soft 1-2/6 diastolic murmur 3rd-4th ICS left sternal border  ABDOMEN: soft, nontender, nondistended, normoactive bowel sounds  EXT: 2+ radial pulses, no edema noted  NEURO: aaox4, proper affect  SKIN: intact, no new lesions noted                                         --------------------------------------------------------------  ASSESSMENT & PLAN  Past medical history and hospital course:  77 yo F patient with a PMH of hypothyroidism and HIV on HAART, presents to the ED with hematemesis.    #Hematemesis secondary to #Matilda-Jiménez Tear - found on EGD by GI on 12/25, s/p 3 clips applied  - Hb has been stable, advancing diet to soft today  - s/p 1x Erythromycin 250mg IV, s/p PPI drip, now on Protonix 40mg BID  - no intervention as per Vascular Sx, resumed DVT ppx    #UTI - +UA on 12/25, UCx pending, started on Rocephin 1g daily, d/c on PO Cefpodoxime 100mg PO BID for 7d total (last day 12/31)  #HIV - on Biktarvy daily, unknown CD4/viral load, ordered/pending, outpatient f/u                                                                            ----------------------------------------------------  # DVT prophylaxis: Lovenox 40mg subQ daily  # GI prophylaxis: Protonix suspension PO 40mg BID  # Diet: Soft/Bite Sized  # Activity: Increase as Tolerated  # Code status: Full  # Disposition: possible d/c today                                                                           --------------------------------------------------------  # Handoff: possible d/c today if tolerating diet advancement

## 2021-12-27 NOTE — DISCHARGE NOTE PROVIDER - CARE PROVIDERS DIRECT ADDRESSES
[email or web address]

## 2021-12-27 NOTE — DISCHARGE NOTE NURSING/CASE MANAGEMENT/SOCIAL WORK - PATIENT PORTAL LINK FT
You can access the FollowMyHealth Patient Portal offered by [facility] by registering at the following website: [email or web address]. By joining [facility]’s FollowMyHealth portal, you will also be able to view your health information using other applications (apps) compatible with our system.

## 2021-12-27 NOTE — DISCHARGE NOTE PROVIDER - PROVIDER TOKENS
PROVIDER:[TOKEN:[[id number]],FOLLOWUP:[2 weeks]],PROVIDER:[TOKEN:[[id number]],FOLLOWUP:[2 weeks]],PROVIDER:[TOKEN:[[id number]],FOLLOWUP:[1 week]]

## 2021-12-27 NOTE — PROGRESS NOTE ADULT - ATTENDING COMMENTS
seen this morning    discussed with resident    tolerating diet    stable for discharge
Attending Statement: I have personally performed a face to face diagnostic evaluation on this patient. The patient is suffering from:  UGIB due to Matilda Jiménez tear -   SMA dissection  I have reviewed the above note and agree with the history, exam and suggestions for care, except as I have noted in the text. I have amended the treatment plans as necessary.
Stable No more evidence of bleeding.

## 2021-12-27 NOTE — DISCHARGE NOTE PROVIDER - NSDCCPCAREPLAN_GEN_ALL_CORE_FT
PRINCIPAL DISCHARGE DIAGNOSIS  Diagnosis: Matilda-Jiménez tear  Assessment and Plan of Treatment: Your bleeding in your vomitus was due to a tear in your stomach, which was repaired in the hospital. Please follow-up with your gastroenterologist and primary care provider.      SECONDARY DISCHARGE DIAGNOSES  Diagnosis: HIV infection  Assessment and Plan of Treatment: Please follow-up with an infectious disease specialist for your HIV.

## 2021-12-27 NOTE — DISCHARGE NOTE PROVIDER - HOSPITAL COURSE
Patient is a 76 year old female with PMHx of HIV (on HAART therapy, unknown CD4 count and viral load), breast cancer s/p right lumpectomy, PE/DVT (3 years ago, on ASA, not currently on anticoagulation), PUD (15 years ago), depression, presented after several episodes of vomiting after eating dinner with blood noted in emesis. Reported taking Aleve daily, felt dizzy, also noted watery nonbloody diarrhea. Had an EGD and colonoscopy 7 years prior in [place].     In ED, /82, HR 84, RR 18 Spo2 99 on room air. Stable Hb in ED. Positive UA for UTI. Negative CELSO exam. CTA-Abdomen showed active arterial extravasation within the lesser curvature of the stomach, just below the gastroesophageal junction; and asymmetric wall thickening along the lesser curvature with suggestion of focal outpouching in the region of active arterial extravasation, reflecting active GI bleed secondary to ulcer disease.    Patient was started on protonix drip, 1x IV erythromycin, rocephin for UTI. EGD was done on 12/25, showed Matilda Jiménze tear, clipped with no further bleeding. Hb has been stable, diet advanced with no issues. Stable for d/c.

## 2021-12-27 NOTE — DISCHARGE NOTE PROVIDER - NSDCMRMEDTOKEN_GEN_ALL_CORE_FT
aspirin 81 mg oral delayed release tablet: 1 tab(s) orally once a day  Biktarvy oral tablet: 1 tab(s) orally once a day  brimonidine 0.2% ophthalmic solution: 1 drop(s) to each affected eye 3 times a day  cefpodoxime 100 mg oral tablet: 1 tab(s) orally 2 times a day, last day 12/31  citalopram 20 mg oral tablet: 1 tab(s) orally once a day  latanoprost 0.005% ophthalmic solution: 1 drop(s) to each affected eye once a day (in the evening)  losartan 100 mg oral tablet: 1 tab(s) orally once a day  montelukast 10 mg oral tablet: 1 tab(s) orally once a day  pantoprazole 40 mg oral granule, delayed release: 1 cap(s) orally 2 times a day   rosuvastatin 20 mg oral tablet: 1 tab(s) orally once a day   aspirin 81 mg oral delayed release tablet: 1 tab(s) orally once a day  Biktarvy oral tablet: 1 tab(s) orally once a day  brimonidine 0.2% ophthalmic solution: 1 drop(s) to each affected eye 3 times a day  cefpodoxime 100 mg oral tablet: 1 tab(s) orally 2 times a day, last day 12/31  citalopram 20 mg oral tablet: 1 tab(s) orally once a day  latanoprost 0.005% ophthalmic solution: 1 drop(s) to each affected eye once a day (in the evening)  montelukast 10 mg oral tablet: 1 tab(s) orally once a day  pantoprazole 40 mg oral granule, delayed release: 1 cap(s) orally 2 times a day   rosuvastatin 20 mg oral tablet: 1 tab(s) orally once a day

## 2021-12-27 NOTE — DISCHARGE NOTE NURSING/CASE MANAGEMENT/SOCIAL WORK - NSDCPEFALRISK_GEN_ALL_CORE
For information on Fall & Injury Prevention, visit: [email or web address] OR  [email or web address] OR  https://www.cdc.gov/steadi/patient.html

## 2021-12-30 LAB — HIV 1+2 AB+HIV1 P24 AG SERPL QL IA: REACTIVE

## 2022-01-04 DIAGNOSIS — K52.9 NONINFECTIVE GASTROENTERITIS AND COLITIS, UNSPECIFIED: ICD-10-CM

## 2022-01-04 DIAGNOSIS — N39.0 URINARY TRACT INFECTION, SITE NOT SPECIFIED: ICD-10-CM

## 2022-01-04 DIAGNOSIS — Z86.718 PERSONAL HISTORY OF OTHER VENOUS THROMBOSIS AND EMBOLISM: ICD-10-CM

## 2022-01-04 DIAGNOSIS — Z95.0 PRESENCE OF CARDIAC PACEMAKER: ICD-10-CM

## 2022-01-04 DIAGNOSIS — K92.0 HEMATEMESIS: ICD-10-CM

## 2022-01-04 DIAGNOSIS — Z86.711 PERSONAL HISTORY OF PULMONARY EMBOLISM: ICD-10-CM

## 2022-01-04 DIAGNOSIS — I95.9 HYPOTENSION, UNSPECIFIED: ICD-10-CM

## 2022-01-04 DIAGNOSIS — K22.6 GASTRO-ESOPHAGEAL LACERATION-HEMORRHAGE SYNDROME: ICD-10-CM

## 2022-01-04 DIAGNOSIS — Z21 ASYMPTOMATIC HUMAN IMMUNODEFICIENCY VIRUS [HIV] INFECTION STATUS: ICD-10-CM

## 2022-01-04 DIAGNOSIS — E03.9 HYPOTHYROIDISM, UNSPECIFIED: ICD-10-CM

## 2022-01-04 DIAGNOSIS — Z20.822 CONTACT WITH AND (SUSPECTED) EXPOSURE TO COVID-19: ICD-10-CM

## 2022-01-04 DIAGNOSIS — Z85.3 PERSONAL HISTORY OF MALIGNANT NEOPLASM OF BREAST: ICD-10-CM

## 2022-02-08 NOTE — H&P ADULT - ATTENDING COMMENTS
Attending Statement: I have personally performed a face to face diagnostic evaluation on this patient. The patient is suffering from:  Upper GI bleed   PUD  I have reviewed the above note and agree with the history, exam and suggestions for care, except as I have noted in the text. I have amended the treatment plans as necessary. DISPLAY PLAN FREE TEXT